# Patient Record
Sex: FEMALE | Race: WHITE | NOT HISPANIC OR LATINO | Employment: UNEMPLOYED | URBAN - METROPOLITAN AREA
[De-identification: names, ages, dates, MRNs, and addresses within clinical notes are randomized per-mention and may not be internally consistent; named-entity substitution may affect disease eponyms.]

---

## 2014-07-23 LAB
PAP RECOMMENDATION EXT: NORMAL
PAP SMEAR: NORMAL

## 2022-03-30 ENCOUNTER — HISTORICAL (OUTPATIENT)
Dept: ADMINISTRATIVE | Facility: HOSPITAL | Age: 32
End: 2022-03-30

## 2022-04-01 ENCOUNTER — PATIENT OUTREACH (OUTPATIENT)
Dept: EMERGENCY MEDICINE | Facility: HOSPITAL | Age: 32
End: 2022-04-01

## 2022-04-04 ENCOUNTER — PATIENT OUTREACH (OUTPATIENT)
Dept: EMERGENCY MEDICINE | Facility: HOSPITAL | Age: 32
End: 2022-04-04

## 2022-04-20 ENCOUNTER — PATIENT OUTREACH (OUTPATIENT)
Dept: EMERGENCY MEDICINE | Facility: HOSPITAL | Age: 32
End: 2022-04-20

## 2022-05-05 ENCOUNTER — PATIENT OUTREACH (OUTPATIENT)
Dept: EMERGENCY MEDICINE | Facility: HOSPITAL | Age: 32
End: 2022-05-05

## 2022-05-14 ENCOUNTER — HOSPITAL ENCOUNTER (EMERGENCY)
Facility: HOSPITAL | Age: 32
Discharge: HOME | End: 2022-05-14
Attending: STUDENT IN AN ORGANIZED HEALTH CARE EDUCATION/TRAINING PROGRAM
Payer: MEDICAID

## 2022-05-14 VITALS
SYSTOLIC BLOOD PRESSURE: 115 MMHG | RESPIRATION RATE: 13 BRPM | OXYGEN SATURATION: 99 % | DIASTOLIC BLOOD PRESSURE: 60 MMHG | TEMPERATURE: 97 F | HEART RATE: 83 BPM

## 2022-05-14 DIAGNOSIS — Z3A.17 17 WEEKS GESTATION OF PREGNANCY: ICD-10-CM

## 2022-05-14 DIAGNOSIS — N39.0 URINARY TRACT INFECTION WITHOUT HEMATURIA, SITE UNSPECIFIED: Primary | ICD-10-CM

## 2022-05-14 DIAGNOSIS — R55 SYNCOPE, UNSPECIFIED SYNCOPE TYPE: ICD-10-CM

## 2022-05-14 DIAGNOSIS — R55 SYNCOPE: ICD-10-CM

## 2022-05-14 DIAGNOSIS — E86.0 DEHYDRATION: ICD-10-CM

## 2022-05-14 DIAGNOSIS — R10.9 ABDOMINAL PAIN: ICD-10-CM

## 2022-05-14 LAB
ALBUMIN SERPL-MCNC: 2.8 GM/DL (ref 3.5–5)
ALBUMIN/GLOB SERPL: 0.7 RATIO (ref 1.1–2)
ALP SERPL-CCNC: 130 UNIT/L (ref 40–150)
ALT SERPL-CCNC: 12 UNIT/L (ref 0–55)
APPEARANCE UR: ABNORMAL
AST SERPL-CCNC: 13 UNIT/L (ref 5–34)
B-HCG FREE SERPL-ACNC: ABNORMAL MIU/ML
B-HCG SERPL QL: NEGATIVE
BACTERIA #/AREA URNS AUTO: ABNORMAL /HPF
BASOPHILS # BLD AUTO: 0.04 X10(3)/MCL (ref 0–0.2)
BASOPHILS NFR BLD AUTO: 0.3 %
BILIRUB UR QL STRIP.AUTO: NEGATIVE MG/DL
BILIRUBIN DIRECT+TOT PNL SERPL-MCNC: 0.3 MG/DL
BUN SERPL-MCNC: 7.5 MG/DL (ref 7–18.7)
CALCIUM SERPL-MCNC: 8.6 MG/DL (ref 8.4–10.2)
CHLORIDE SERPL-SCNC: 103 MMOL/L (ref 98–107)
CO2 SERPL-SCNC: 23 MMOL/L (ref 22–29)
COLOR UR AUTO: ABNORMAL
CREAT SERPL-MCNC: 0.65 MG/DL (ref 0.55–1.02)
EOSINOPHIL # BLD AUTO: 0.14 X10(3)/MCL (ref 0–0.9)
EOSINOPHIL NFR BLD AUTO: 1.1 %
ERYTHROCYTE [DISTWIDTH] IN BLOOD BY AUTOMATED COUNT: 14.6 % (ref 11.5–17)
GLOBULIN SER-MCNC: 3.9 GM/DL (ref 2.4–3.5)
GLUCOSE SERPL-MCNC: 86 MG/DL (ref 74–100)
GLUCOSE UR QL STRIP.AUTO: NEGATIVE MG/DL
HCT VFR BLD AUTO: 37.7 % (ref 37–47)
HGB BLD-MCNC: 12.4 GM/DL (ref 12–16)
IMM GRANULOCYTES # BLD AUTO: 0.04 X10(3)/MCL (ref 0–0.02)
IMM GRANULOCYTES NFR BLD AUTO: 0.3 % (ref 0–0.43)
KETONES UR QL STRIP.AUTO: ABNORMAL MG/DL
LEUKOCYTE ESTERASE UR QL STRIP.AUTO: ABNORMAL UNIT/L
LYMPHOCYTES # BLD AUTO: 1.77 X10(3)/MCL (ref 0.6–4.6)
LYMPHOCYTES NFR BLD AUTO: 13.9 %
MCH RBC QN AUTO: 34.1 PG (ref 27–31)
MCHC RBC AUTO-ENTMCNC: 32.9 MG/DL (ref 33–36)
MCV RBC AUTO: 103.6 FL (ref 80–94)
MONOCYTES # BLD AUTO: 0.54 X10(3)/MCL (ref 0.1–1.3)
MONOCYTES NFR BLD AUTO: 4.2 %
NEUTROPHILS # BLD AUTO: 10.2 X10(3)/MCL (ref 2.1–9.2)
NEUTROPHILS NFR BLD AUTO: 80.2 %
NITRITE UR QL STRIP.AUTO: POSITIVE
NRBC BLD AUTO-RTO: 0 %
PH UR STRIP.AUTO: 6 [PH]
PLATELET # BLD AUTO: 474 X10(3)/MCL (ref 130–400)
PMV BLD AUTO: 9.9 FL (ref 9.4–12.4)
POTASSIUM SERPL-SCNC: 4.1 MMOL/L (ref 3.5–5.1)
PROT SERPL-MCNC: 6.7 GM/DL (ref 6.4–8.3)
PROT UR QL STRIP.AUTO: ABNORMAL MG/DL
RBC # BLD AUTO: 3.64 X10(6)/MCL (ref 4.2–5.4)
RBC #/AREA URNS AUTO: <5 /HPF
RBC UR QL AUTO: ABNORMAL UNIT/L
SODIUM SERPL-SCNC: 135 MMOL/L (ref 136–145)
SP GR UR STRIP.AUTO: 1.02 (ref 1–1.03)
SQUAMOUS #/AREA URNS AUTO: 16 /LPF
UROBILINOGEN UR STRIP-ACNC: 1 MG/DL
WBC # SPEC AUTO: 12.7 X10(3)/MCL (ref 4.5–11.5)
WBC #/AREA URNS AUTO: 102 /HPF

## 2022-05-14 PROCEDURE — 87077 CULTURE AEROBIC IDENTIFY: CPT | Performed by: STUDENT IN AN ORGANIZED HEALTH CARE EDUCATION/TRAINING PROGRAM

## 2022-05-14 PROCEDURE — 80053 COMPREHEN METABOLIC PANEL: CPT | Performed by: STUDENT IN AN ORGANIZED HEALTH CARE EDUCATION/TRAINING PROGRAM

## 2022-05-14 PROCEDURE — 93005 ELECTROCARDIOGRAM TRACING: CPT

## 2022-05-14 PROCEDURE — 36415 COLL VENOUS BLD VENIPUNCTURE: CPT | Performed by: STUDENT IN AN ORGANIZED HEALTH CARE EDUCATION/TRAINING PROGRAM

## 2022-05-14 PROCEDURE — 25000003 PHARM REV CODE 250: Performed by: STUDENT IN AN ORGANIZED HEALTH CARE EDUCATION/TRAINING PROGRAM

## 2022-05-14 PROCEDURE — 85025 COMPLETE CBC W/AUTO DIFF WBC: CPT | Performed by: STUDENT IN AN ORGANIZED HEALTH CARE EDUCATION/TRAINING PROGRAM

## 2022-05-14 PROCEDURE — 93010 ELECTROCARDIOGRAM REPORT: CPT | Mod: ,,, | Performed by: INTERNAL MEDICINE

## 2022-05-14 PROCEDURE — 81001 URINALYSIS AUTO W/SCOPE: CPT | Performed by: STUDENT IN AN ORGANIZED HEALTH CARE EDUCATION/TRAINING PROGRAM

## 2022-05-14 PROCEDURE — 81025 URINE PREGNANCY TEST: CPT | Performed by: STUDENT IN AN ORGANIZED HEALTH CARE EDUCATION/TRAINING PROGRAM

## 2022-05-14 PROCEDURE — 93010 EKG 12-LEAD: ICD-10-PCS | Mod: ,,, | Performed by: INTERNAL MEDICINE

## 2022-05-14 PROCEDURE — 99285 EMERGENCY DEPT VISIT HI MDM: CPT | Mod: 25

## 2022-05-14 RX ORDER — MUPIROCIN 20 MG/G
OINTMENT TOPICAL 3 TIMES DAILY
Qty: 2 G | Refills: 0 | Status: SHIPPED | OUTPATIENT
Start: 2022-05-14 | End: 2022-05-21

## 2022-05-14 RX ORDER — CEPHALEXIN 500 MG/1
500 CAPSULE ORAL 4 TIMES DAILY
Qty: 20 CAPSULE | Refills: 0 | Status: SHIPPED | OUTPATIENT
Start: 2022-05-14 | End: 2022-05-21

## 2022-05-14 RX ORDER — CEPHALEXIN 500 MG/1
500 CAPSULE ORAL 4 TIMES DAILY
Qty: 20 CAPSULE | Refills: 0 | Status: SHIPPED | OUTPATIENT
Start: 2022-05-14 | End: 2022-05-14 | Stop reason: SDUPTHER

## 2022-05-14 RX ADMIN — SODIUM CHLORIDE 1000 ML: 9 INJECTION, SOLUTION INTRAVENOUS at 12:05

## 2022-05-14 NOTE — DISCHARGE INSTRUCTIONS
Follow up with your primary care physician in 2-3 days.      Return to the emergency department if any worsening symptoms, fever, chest pain, difficulty breathing, or any other new symptoms or concerns.

## 2022-05-14 NOTE — ED PROVIDER NOTES
Encounter Date: 2022    SCRIBE #1 NOTE: I, Brayan Caruso, am scribing for, and in the presence of,  Dr. Sutherland. I have scribed the entire note.       History     Chief Complaint   Patient presents with    Loss of Consciousness     Pt to ED via [facility] for near syncope, generalized weakness after standing outside for about an hour. 16 weeks preg, . Initial O2 sat 92% on RA Per EMS, pt tolerating RA.     A 31 year old female who is about 16-17 weeks pregnant presents to the ED via EMS after a syncopal episode pta at [facility]. Per EMS, the pt was standing outside in the heat for about 1.5 hours. Pt has nausea and lower abdominal pain. Pt denies vaginal bleeding, seizure, hitting her head, and substance abuse. Pt reports that she had her pregnancy confirmed, but pt has received minimal care.    The history is provided by the patient and the EMS personnel.   Illness   The current episode started just prior to arrival. Episode frequency: A single episode. The problem has been resolved. Nothing relieves the symptoms. Nothing aggravates the symptoms. Associated symptoms include abdominal pain and nausea. Pertinent negatives include no fever, no vomiting, no congestion, no rhinorrhea, no sore throat, no cough, no shortness of breath and no rash. She has received no recent medical care.     Review of patient's allergies indicates:   Allergen Reactions    Quetiapine Anaphylaxis    Iodine     Shellfish derived      and fish     History reviewed. No pertinent past medical history.  History reviewed. No pertinent surgical history.  History reviewed. No pertinent family history.     Review of Systems   Constitutional: Negative for chills and fever.   HENT: Negative for congestion, rhinorrhea and sore throat.    Eyes: Negative for visual disturbance.   Respiratory: Negative for cough and shortness of breath.    Cardiovascular: Negative for chest pain and leg swelling.   Gastrointestinal: Positive for  abdominal pain and nausea. Negative for vomiting.   Genitourinary: Negative for dysuria, hematuria, vaginal bleeding and vaginal discharge.   Musculoskeletal: Negative for joint swelling.   Skin: Negative for rash.   Neurological: Positive for syncope. Negative for seizures and weakness.   Psychiatric/Behavioral: Negative for confusion.       Physical Exam     Initial Vitals [05/14/22 1145]   BP Pulse Resp Temp SpO2   124/74 110 12 97.2 °F (36.2 °C) 98 %      MAP       --         Physical Exam    Nursing note and vitals reviewed.  Constitutional: She is not diaphoretic. No distress.   HENT:   Head: Normocephalic and atraumatic.   Pt has dry mucous membranes.    Eyes: EOM are normal. Pupils are equal, round, and reactive to light.   Neck: Neck supple.   Normal range of motion.  Cardiovascular: Normal rate and regular rhythm.   No murmur heard.  Pulmonary/Chest: Breath sounds normal. No respiratory distress. She has no wheezes. She has no rales.   Abdominal: Abdomen is soft. She exhibits no distension. There is no abdominal tenderness.   Pt's uterus is just below the umbilicus upon palpation.    Musculoskeletal:         General: Normal range of motion.      Cervical back: Normal range of motion and neck supple.     Neurological: She is alert and oriented to person, place, and time. She has normal strength.   Skin: Skin is warm. No rash noted.   Psychiatric: She has a normal mood and affect.         ED Course   Procedures  Labs Reviewed   COMPREHENSIVE METABOLIC PANEL - Abnormal; Notable for the following components:       Result Value    Sodium Level 135 (*)     Albumin Level 2.8 (*)     Globulin 3.9 (*)     Albumin/Globulin Ratio 0.7 (*)     All other components within normal limits   URINALYSIS, REFLEX TO URINE CULTURE - Abnormal; Notable for the following components:    Color, UA Dark Yellow (*)     Appearance, UA Cloudy (*)     Protein, UA 1+ (*)     Ketones, UA 1+ (*)     Blood, UA Trace (*)     Nitrites, UA  Positive (*)     Leukocyte Esterase, UA 2+ (*)     All other components within normal limits   HCG, QUANTITATIVE - Abnormal; Notable for the following components:    Beta Human Chorionic Gonadotropin Quantitative 17,903.12 (*)     All other components within normal limits   CBC WITH DIFFERENTIAL - Abnormal; Notable for the following components:    WBC 12.7 (*)     RBC 3.64 (*)     .6 (*)     MCH 34.1 (*)     MCHC 32.9 (*)     Platelet 474 (*)     Neut # 10.2 (*)     IG# 0.04 (*)     All other components within normal limits   URINALYSIS, MICROSCOPIC - Abnormal; Notable for the following components:    WBC,  (*)     Squamous Epithelial Cells, UA 16 (*)     Bacteria, UA 4+ (*)     All other components within normal limits   PREGNANCY TEST, URINE RAPID - Normal   CULTURE, URINE   CBC W/ AUTO DIFFERENTIAL    Narrative:     The following orders were created for panel order CBC auto differential.  Procedure                               Abnormality         Status                     ---------                               -----------         ------                     CBC with Differential[[id number]]        Abnormal            Final result                 Please view results for these tests on the individual orders.     EKG Readings: (Independently Interpreted)   NSR at rate of 85, normal axis/intervals, no ST-T segment changes 1210         ECG Results          EKG 12-lead (Final result)  Result time 05/14/22 17:46:48    Final result by Interface, Lab In [facility] (05/14/22 17:46:48)                 Narrative:    Test Reason : R55,    Vent. Rate : 085 BPM     Atrial Rate : 085 BPM     P-R Int : 152 ms          QRS Dur : 094 ms      QT Int : 396 ms       P-R-T Axes : 057 041 045 degrees     QTc Int : 471 ms    Normal sinus rhythm  Normal ECG  No previous ECGs available  Confirmed by Deni Boateng MD ([id number]) on 5/14/2022 5:46:43 PM    Referred By: HUY   SELF           Confirmed By:Deni Boateng MD                             Imaging Results          US OB 14+ Wks, TransAbd, Single Gestation (Final result)  Result time 05/14/22 13:00:54    Final result by Parrish Restrepo MD (05/14/22 13:00:54)                 Impression:      Single live intrauterine fetus with average ultrasound age of 17 weeks 4 days.      Electronically signed by: Parrish Restrepo  Date:    05/14/2022  Time:    13:00             Narrative:    EXAMINATION:  US OB 14+ WEEKS, TRANSABDOM, SINGLE GESTATION    CLINICAL HISTORY:  Unspecified abdominal pain    TECHNIQUE:  Transabdominal sonographic imaging of the pelvis.    COMPARISON:  Pelvic ultrasound 03/30/2022    FINDINGS:  Single intrauterine fetus identified in vertex position.  Posterior placenta.  Fetal movement documented on the cine clips.    The following series of measurements were obtained during the exam:    Biparietal diameter = 3.75 cm/17 weeks 3 days    Head circumference = 14.05 cm/17 weeks 3 days    Abdominal circumference = 12.10 cm/17 weeks 5 days    Femur length = 2.51 cm/17 weeks 4 days    Average gestational age by ultrasound (AUA) = 17 weeks 4 days +/-1 week 2 days    Estimated of delivery by ultrasound (NURIS) = 10/18/2022    Estimated fetal weight = 7 ounces +/-1 ounce    Fetal heart rate = 141 bpm    Single deepest pocket = 5.2 cm                                 Medications   sodium chloride 0.9% bolus 1,000 mL (1,000 mLs Intravenous New Bag 5/14/22 1245)     Medical Decision Making:   Initial Assessment:   32 yo 16-17 weeks by EGA, syncopal event standing in sun for couple of hours today. No vaginal bleedng. Dehydrated on exam. EKG wnl. Feels better after hydration, US with single IUP with normal appearance. Other workup with UTI, otherwise unremarkable. .   Differential Diagnosis:   Dehydration, orthostatic hypotension, anemia  Independently Interpreted Test(s):   I have ordered and independently interpreted EKG Reading(s) - see prior notes  Clinical Tests:   Lab Tests: Ordered and  "Reviewed  Radiological Study: Ordered and Reviewed  ED Management:  IV fluids           Scribe Attestation:   Scribe #1: I performed the above scribed service and the documentation accurately describes the services I performed. I attest to the accuracy of the note.    Attending Attestation:           Physician Attestation for Scribe:  Physician Attestation Statement for Scribe #1: I, Dr. Sutherland, reviewed documentation, as scribed by Brayan Caruso in my presence, and it is both accurate and complete.             ED Course as of 05/14/22 2023   Sat May 14, 2022   1508 NITRITE UA(!): Positive [AC]   1508 Leukocytes, UA(!): 2+ [AC]   1508 Bacteria, UA(!): 4+ [AC]   1508 WBC, UA(!): 102 [AC]   1508 Patient feels better. UTI with infection. Patient reported to me some labial pain/swelling, stated something "bit her". I examined her with Lynn as a chaperone - she has some mild R upper labial irriation, bartholins cyst or abscess. She will follow up with OBGYN in 2-3 days, I advised her to make sure they reealuate this area then.  [AC]      ED Course User Index  [AC] Eze Sutherland IV, MD             Clinical Impression:   Final diagnoses:  [R55] Syncope  [R10.9] Abdominal pain  [E86.0] Dehydration  [R55] Syncope, unspecified syncope type  [N39.0] Urinary tract infection without hematuria, site unspecified (Primary)  [Z3A.17] 17 weeks gestation of pregnancy          ED Disposition Condition    Discharge Stable        ED Prescriptions     Medication Sig Dispense Start Date End Date Auth. Provider    cephALEXin (KEFLEX) 500 MG capsule  (Status: Discontinued) Take 1 capsule (500 mg total) by mouth 4 (four) times daily. for 5 days 20 capsule 5/14/2022 5/14/2022 Eze Sutherland IV, MD    cephALEXin (KEFLEX) 500 MG capsule Take 1 capsule (500 mg total) by mouth 4 (four) times daily. for 7 days 20 capsule 5/14/2022 5/21/2022 Eze Sutherland IV, MD    mupirocin (BACTROBAN) 2 % ointment Apply topically 3 (three) times daily. Apply " to affected area up to 3x daily for 7 days 2 g 5/14/2022 5/21/2022 Eze Sutherland IV, MD        Follow-up Information     Follow up With Specialties Details Why Contact Info    OBGYN  Go in 2 days  Follow up with your OBGYN in 2 days    [facility] Emergency Dept Emergency Medicine Go to  If symptoms worsen [address] [facility] [address]  [phone]      IEze MD personally performed the history, PE, MDM, and procedures as documented above and agree with the scribe's documentation.        Eze Sutherland IV, MD  05/14/22 2024

## 2022-05-14 NOTE — ED NOTES
Orthostatic VS:  Supine:111/72 HR 96  Sittin/76 HR 90  Pt unable to stand d/t feeling weak and dizzy

## 2022-05-14 NOTE — ED NOTES
Pt reports syncopal episode today @ approx 10am, pt denied hitting head or on blood thinners. Pt reports being 16-17w pregnant

## 2022-05-17 LAB — BACTERIA UR CULT: ABNORMAL

## 2022-05-19 ENCOUNTER — PATIENT OUTREACH (OUTPATIENT)
Dept: EMERGENCY MEDICINE | Facility: HOSPITAL | Age: 32
End: 2022-05-19
Payer: MEDICAID

## 2022-07-12 ENCOUNTER — OFFICE VISIT (OUTPATIENT)
Dept: FAMILY MEDICINE | Facility: CLINIC | Age: 32
End: 2022-07-12
Payer: MEDICAID

## 2022-07-12 VITALS
BODY MASS INDEX: 26.87 KG/M2 | HEIGHT: 62 IN | SYSTOLIC BLOOD PRESSURE: 99 MMHG | HEART RATE: 108 BPM | TEMPERATURE: 98 F | DIASTOLIC BLOOD PRESSURE: 64 MMHG | RESPIRATION RATE: 20 BRPM | OXYGEN SATURATION: 98 % | WEIGHT: 146 LBS

## 2022-07-12 DIAGNOSIS — O98.112 SYPHILIS AFFECTING PREGNANCY IN SECOND TRIMESTER: ICD-10-CM

## 2022-07-12 DIAGNOSIS — Z67.91 RH NEGATIVE STATE IN ANTEPARTUM PERIOD: ICD-10-CM

## 2022-07-12 DIAGNOSIS — O26.899 RH NEGATIVE STATE IN ANTEPARTUM PERIOD: ICD-10-CM

## 2022-07-12 DIAGNOSIS — Z13.31 POSITIVE DEPRESSION SCREENING: ICD-10-CM

## 2022-07-12 DIAGNOSIS — F15.10 METHAMPHETAMINE USE: ICD-10-CM

## 2022-07-12 DIAGNOSIS — G47.00 INSOMNIA, UNSPECIFIED TYPE: ICD-10-CM

## 2022-07-12 DIAGNOSIS — K21.9 GASTROESOPHAGEAL REFLUX DISEASE, UNSPECIFIED WHETHER ESOPHAGITIS PRESENT: ICD-10-CM

## 2022-07-12 DIAGNOSIS — Z72.0 TOBACCO USER: ICD-10-CM

## 2022-07-12 DIAGNOSIS — Z3A.26 26 WEEKS GESTATION OF PREGNANCY: Primary | ICD-10-CM

## 2022-07-12 LAB
AMPHET UR QL SCN: POSITIVE
APPEARANCE UR: ABNORMAL
BACTERIA #/AREA URNS AUTO: ABNORMAL /HPF
BARBITURATE SCN PRESENT UR: NEGATIVE
BENZODIAZ UR QL SCN: POSITIVE
BILIRUB SERPL-MCNC: NORMAL MG/DL
BILIRUB UR QL STRIP.AUTO: NEGATIVE MG/DL
BLOOD URINE, POC: NORMAL
CANNABINOIDS UR QL SCN: NEGATIVE
CLARITY, POC UA: NORMAL
COCAINE UR QL SCN: NEGATIVE
COLOR UR AUTO: YELLOW
COLOR, POC UA: NORMAL
FENTANYL UR QL SCN: NEGATIVE
GLUCOSE UR QL STRIP.AUTO: NEGATIVE MG/DL
GLUCOSE UR QL STRIP: NORMAL
KETONES UR QL STRIP.AUTO: 5 MG/DL
KETONES UR QL STRIP: NORMAL
LEUKOCYTE ESTERASE UR QL STRIP.AUTO: 500 UNIT/L
LEUKOCYTE ESTERASE URINE, POC: NORMAL
MDMA UR QL SCN: NEGATIVE
MUCOUS THREADS URNS QL MICRO: ABNORMAL /LPF
NITRITE UR QL STRIP.AUTO: NEGATIVE
NITRITE, POC UA: NORMAL
OPIATES UR QL SCN: POSITIVE
PCP UR QL: NEGATIVE
PH UR STRIP.AUTO: 5.5 [PH]
PH UR: 5.5 [PH] (ref 3–11)
PH, POC UA: 5.5
PROT UR QL STRIP.AUTO: ABNORMAL MG/DL
PROTEIN, POC: NORMAL
RBC #/AREA URNS AUTO: ABNORMAL /HPF
RBC UR QL AUTO: NEGATIVE UNIT/L
SP GR UR STRIP.AUTO: 1.02
SPECIFIC GRAVITY, POC UA: 1.02
SPECIFIC GRAVITY, URINE AUTO (.000) (OHS): 1.02 (ref 1–1.03)
SQUAMOUS #/AREA URNS LPF: ABNORMAL /HPF
UROBILINOGEN UR STRIP-ACNC: 4 MG/DL
UROBILINOGEN, POC UA: 1
WBC #/AREA URNS AUTO: ABNORMAL /HPF
YEAST BUDDING URNS QL: ABNORMAL /HPF

## 2022-07-12 PROCEDURE — 81001 URINALYSIS AUTO W/SCOPE: CPT | Mod: 59

## 2022-07-12 PROCEDURE — 99214 OFFICE O/P EST MOD 30 MIN: CPT | Mod: PBBFAC

## 2022-07-12 PROCEDURE — 80307 DRUG TEST PRSMV CHEM ANLYZR: CPT

## 2022-07-12 PROCEDURE — 87088 URINE BACTERIA CULTURE: CPT

## 2022-07-12 PROCEDURE — 81002 URINALYSIS NONAUTO W/O SCOPE: CPT | Mod: PBBFAC

## 2022-07-12 RX ORDER — HYDROXYZINE PAMOATE 25 MG/1
25 CAPSULE ORAL NIGHTLY PRN
Qty: 30 CAPSULE | Refills: 1 | Status: SHIPPED | OUTPATIENT
Start: 2022-07-12 | End: 2022-08-11

## 2022-07-12 NOTE — PROGRESS NOTES
"[facility] OB OFFICE VISIT NOTE  Nancy Ramos  [id number]  2022      Chief Complaint: Initial Prenatal Visit (IOB 26w3d. C/O: intermittent bladder leakage.)      Nancy Ramos is a 31 y.o.  female 26w3d by US completed on 3/30/22. (NURIS: 10/18/22) presenting to [facility] for initial prenatal visit.    Current Issues: trouble sleeping at night. Takes care of her mother at night. Also complaining of heartburn, daily. With associated nausea and vomiting 1-2x/week. Previous OB was Dr. Matias. Reports only taking prenatal vitamins with occasional meth and tobacco use. Would like resources on how to get "clean". Denies any SI or HI    Chronic Issues:   -HLD (dx at age 16)-Was taking a medication but quit taking it 1 year ago, does not recall the name of the drug.    -Rh negative-had vaginal bleeding 1st 2 months of pregnancy, states that she did receive RhoGAM shots with each episode.   -Hx of Syphillis- treated at health unit    Gestational History:  - G1: current    Gyn History:   - LMP: 22  - Age at menarche: 9 years  - Menstrual hx: regular, 28 day cycles, 4-5pads/day, 3-4 days per period  - History of birth control: used to take Depo-provera. Would like OCPs as postpartum contraception  - History of STDs and/or Abnormal PAPs: Syphilis, treated at the health unit. Received total of 2 penicillin G injections.     Past Medical History: denies   Surgical History: denies  Family History: denies  Social History: Tobacco use (8 years, 3 cigarettes/day), Meth use (3 days ago). Denies ETOH or other illicit drug use.   Medications: prenatal vitamins    Review of Systems  Constitutional: no fever, no chills  CV: no chest pain  RESP: no SOB  : no dysuria, no hematuria  GI: no constipation, no diarrhea, no nausea, no vomiting  Psych: no depression, no anxiety    Antepartum specific   - Fetal movements: yes  - Vaginal bleeding: no  - Vaginal discharge: no  - Loss of fluid: no  - Contractions: no  - Headaches: 2-3 " "HA/week, resolve with rest  - Vision changes: no  - Edema: no    Physical Exam   Blood pressure 99/64, pulse 108, temperature 97.9 °F (36.6 °C), temperature source Oral, resp. rate 20, height 5' 2" (1.575 m), weight 66.2 kg (146 lb), SpO2 98 %.   General: in no acute distress  RESP: clear to auscultation bilaterally, non labored  CV: regular rate and rhythm, no murmurs, no edema  ABD: gravid, nontender, BS+  FHTs: 150 bpm  Fundal height: 27 cm    Cervical: closed, firm, posterior  External genitalia: Normal female genitalia without lesion, discharge or tenderness.   Speculum Exam: Vaginal vault without discharge, nonodorous, no lesions/masses seen.  Cervical os visualized as closed, no lesions/masses.   Note: RN chaperone present for entirety of genital exam.    Current Medications:   No current outpatient medications on file.     No current facility-administered medications for this visit.       Labs:  Urine dipstick: reviewed     Initial OB Labs: awaiting records for review  - Blood Type and Rh:   - Antibody Screen:   - CBC H/H:   - HIV:   - Syphilis Ab:   - GC:   - CT:   - HBsAg:   - HCVAb:   - Rubella:   - Varicella:   - UA & Culture:   - Sickle Cell Screen:   - PAP:   - Influenza vaccine date:   - BTL desired:     15-20 Weeks Lab  - Quad Screen: out of range       Imaging:   Initial US 3/30/21:   1.  Single viable uterine pregnancy with heart rate of 170 bpm and AUA  of 11 weeks and 4 days.  2.  Low-lying placenta which encroaches on the internal os.    OB US 5/14/22:   Single live intrauterine pregnancy at 17.4WGA. No comment on placenta.    Anatomy Scan : To be scheduled      Assessment:   1. 26 weeks gestation of pregnancy    2. Positive depression screening    3. Tobacco user    4. Methamphetamine use    5. Syphilis affecting pregnancy in second trimester    6. Rh negative state in antepartum period    7. Insomnia, unspecified type    8. Gastroesophageal reflux disease, unspecified whether esophagitis " present        Plan:  - OB Protocol, PNVs   - Urine dip reviewed as above. UA and culture pending   - Routine (initial) labs: PENDING. UDS ordered and pending  - Request for records from Dr. Matias's office sent   - Referral to [facility]   - Mother plans to breast and/or bottlefeed  - Postpartum contraception discussion: OCPs, to be started at 6 week postpartum visit   - Pt counseled extensively on effects of drug use during pregnancy and DCFS involvement if present in newborns urine/meconium. Follow up on drug use at each visit.  - Will need to repeat depression screening. Currently denies any SI/HI. Just wants help with drug abuse cessation.  - [facility]  - [facility]   - Will request records for proof of treatment from health unit   - Pt counseled on safe sex practices. Always use barrier contraception when sexually active.   -Will need RhoGAM at 28 week after initial labs are reviewed and Rh status is confirmed   -Trial of Vistaril for insomnia and Prevacid for acid reflux. Pt educated on sleep hygiene.     Return to [facility] in 3 weeks for routine follow up. If patient's prenatal records not in chart, please obtain all initial labs (especially HepC antibody)  and 28 week labs.     Kristen Ortega MD  [employer]  Resident, HO-3

## 2022-07-13 DIAGNOSIS — Z3A.26 26 WEEKS GESTATION OF PREGNANCY: Primary | ICD-10-CM

## 2022-07-13 PROBLEM — O26.899 RH NEGATIVE STATE IN ANTEPARTUM PERIOD: Status: ACTIVE | Noted: 2022-07-13

## 2022-07-13 PROBLEM — K29.70 GASTRITIS: Status: ACTIVE | Noted: 2022-07-13

## 2022-07-13 PROBLEM — Z67.91 RH NEGATIVE STATE IN ANTEPARTUM PERIOD: Status: ACTIVE | Noted: 2022-07-13

## 2022-07-13 PROBLEM — F41.1 GENERALIZED ANXIETY DISORDER: Status: ACTIVE | Noted: 2022-07-13

## 2022-07-13 PROBLEM — Z72.0 TOBACCO USER: Status: ACTIVE | Noted: 2022-07-13

## 2022-07-13 PROBLEM — O98.112 SYPHILIS AFFECTING PREGNANCY IN SECOND TRIMESTER: Status: ACTIVE | Noted: 2022-07-13

## 2022-07-13 RX ORDER — CALC/MAG/B COMPLEX/D3/HERB 61
15 TABLET ORAL DAILY
Qty: 30 CAPSULE | Refills: 1 | Status: ON HOLD | OUTPATIENT
Start: 2022-07-13 | End: 2022-08-30

## 2022-07-14 LAB — BACTERIA UR CULT: NO GROWTH

## 2022-07-14 NOTE — PROGRESS NOTES
"[facility] OB OFFICE VISIT NOTE  Nancy Ramos  [id number]  2022      Chief Complaint: Initial Prenatal Visit (IOB 26w3d. C/O: intermittent bladder leakage.)      Nancy Ramos is a 31 y.o.  female 26w3d by US completed on 3/30/22. (NURIS: 10/18/22) presenting to [facility] for initial prenatal visit.    Current Issues: trouble sleeping at night. Takes care of her mother at night. Also complaining of heartburn, daily. With associated nausea and vomiting 1-2x/week. Previous OB was Dr. Matias. Reports only taking prenatal vitamins with occasional meth and tobacco use. Would like resources on how to get "clean".     Chronic Issues:   -HLD (dx at age 16)-Was taking a medication but quit taking it 1 year ago, does not recall the name of the drug.    -Rh negative-had vaginal bleeding 1st 2 months of pregnancy, states that she did receive RhoGAM shots with each episode.   -Hx of Syphillis- treated at health unit    Gestational History:  - G1: current    Gyn History:   - LMP: 22  - Age at menarche: 9 years  - Menstrual hx: regular, 28 day cycles, 4-5pads/day, 3-4 days per period  - History of birth control: used to take Depo-provera. Would like OCPs as postpartum contraception  - History of STDs and/or Abnormal PAPs: Syphilis, treated at the health unit. Received total of 2 penicillin G injections.     Past Medical History: denies   Surgical History: denies  Family History: denies  Social History: Tobacco use (8 years, 3 cigarettes/day), Meth use (3 days ago). Denies ETOH or other illicit drug use.   Medications: prenatal vitamins    Review of Systems  Constitutional: no fever, no chills  CV: no chest pain  RESP: no SOB  : no dysuria, no hematuria  GI: no constipation, no diarrhea, no nausea, no vomiting  Psych: no depression, no anxiety    Antepartum specific   - Fetal movements: yes  - Vaginal bleeding: no  - Vaginal discharge: no  - Loss of fluid: no  - Contractions: no  - Headaches: 2-3 HA/week, resolve " "with rest  - Vision changes: no  - Edema: no    Physical Exam   Blood pressure 99/64, pulse 108, temperature 97.9 °F (36.6 °C), temperature source Oral, resp. rate 20, height 5' 2" (1.575 m), weight 66.2 kg (146 lb), SpO2 98 %.   General: in no acute distress  RESP: clear to auscultation bilaterally, non labored  CV: regular rate and rhythm, no murmurs, no edema  ABD: gravid, nontender, BS+  FHTs: 150 bpm  Fundal height: 27 cm    Cervical: closed, firm, posterior  External genitalia: Normal female genitalia without lesion, discharge or tenderness.   Speculum Exam: Vaginal vault without discharge, nonodorous, no lesions/masses seen.  Cervical os visualized as closed, no lesions/masses.   Note: RN chaperone present for entirety of genital exam.      Current Medications:   No current outpatient medications on file.     No current facility-administered medications for this visit.       Labs:  Urine dipstick: reviewed     Initial OB Labs: awaiting records for review  - Blood Type and Rh:   - Antibody Screen:   - CBC H/H:   - HIV:   - Syphilis Ab:   - GC:   - CT:   - HBsAg:   - HCVAb:   - Rubella:   - Varicella:   - UA & Culture:   - Sickle Cell Screen:   - PAP:   - Influenza vaccine date:   - BTL desired:     15-20 Weeks Lab  - Quad Screen: out of range       Imaging:   Initial US 3/30/21:   1.  Single viable uterine pregnancy with heart rate of 170 bpm and AUA  of 11 weeks and 4 days.  2.  Low-lying placenta which encroaches on the internal os.    OB US 5/14/22:   Single live intrauterine pregnancy at 17.4WGA. No comment on placenta.    Anatomy Scan : To be scheduled      Assessment:   1. 26 weeks gestation of pregnancy    2. Positive depression screening    3. Tobacco user    4. Methamphetamine use    5. Syphilis affecting pregnancy in second trimester    6. Rh negative state in antepartum period    7. Insomnia, unspecified type    8. Gastroesophageal reflux disease, unspecified whether esophagitis present  "       Plan:  - OB Protocol, PNVs   - Urine dip reviewed as above. UA and culture pending   - Routine (initial) labs: PENDING. UDS ordered and pending  - Request for records from Dr. Matias's office sent   - Referral to [facility]   - Mother plans to breast and/or bottlefeed  - Postpartum contraception discussion: OCPs, to be started at 6 week postpartum visit   - Pt counseled extensively on effects of drug use during pregnancy and DCFS involvement if present in newborns urine/meconium. Follow up on drug use at each visit.  - [facility]  - Referral to [facility]   - Will request records for proof of treatment from health unit   - Pt counseled on safe sex practices. Always use barrier contraception when sexually active.   -Will need RhoGAM at 28 week after initial labs are reviewed and Rh status is confirmed   -Trial of Vistaril for insomnia and Prevacid for acid reflux. Pt educated on sleep hygiene.     Return to [facility] in 3 weeks for routine follow up. If patient's prenatal records not in chart, please obtain all initial labs (especially HepC antibody)  and 28 week labs.     Kristen Ortega MD  [employer]  Resident, HO-3

## 2022-07-16 ENCOUNTER — HOSPITAL ENCOUNTER (EMERGENCY)
Facility: HOSPITAL | Age: 32
Discharge: LEFT BEFORE DISCHARGE | End: 2022-07-16
Payer: MEDICAID

## 2022-07-16 PROCEDURE — 99281 EMR DPT VST MAYX REQ PHY/QHP: CPT | Mod: 25

## 2022-07-17 ENCOUNTER — HOSPITAL ENCOUNTER (EMERGENCY)
Facility: HOSPITAL | Age: 32
Discharge: HOME | End: 2022-07-17
Attending: EMERGENCY MEDICINE
Payer: COMMERCIAL

## 2022-07-17 ENCOUNTER — HOSPITAL ENCOUNTER (EMERGENCY)
Facility: HOSPITAL | Age: 32
Discharge: HOME | End: 2022-07-17
Payer: COMMERCIAL

## 2022-07-17 VITALS
TEMPERATURE: 97 F | DIASTOLIC BLOOD PRESSURE: 62 MMHG | OXYGEN SATURATION: 100 % | HEART RATE: 83 BPM | SYSTOLIC BLOOD PRESSURE: 108 MMHG

## 2022-07-17 VITALS
HEART RATE: 101 BPM | DIASTOLIC BLOOD PRESSURE: 60 MMHG | OXYGEN SATURATION: 100 % | WEIGHT: 141.13 LBS | TEMPERATURE: 98 F | SYSTOLIC BLOOD PRESSURE: 123 MMHG | HEIGHT: 62 IN | RESPIRATION RATE: 20 BRPM | BODY MASS INDEX: 25.97 KG/M2
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
## 2022-07-17 DIAGNOSIS — R10.9 ABDOMINAL PAIN DURING PREGNANCY IN SECOND TRIMESTER: Primary | ICD-10-CM

## 2022-07-17 DIAGNOSIS — O26.892 ABDOMINAL PAIN DURING PREGNANCY IN SECOND TRIMESTER: Primary | ICD-10-CM
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
## 2022-07-17 DIAGNOSIS — O26.899 ABDOMINAL PAIN AFFECTING PREGNANCY: ICD-10-CM

## 2022-07-17 DIAGNOSIS — R10.9 ABDOMINAL PAIN AFFECTING PREGNANCY: ICD-10-CM

## 2022-07-17 DIAGNOSIS — N30.00 ACUTE CYSTITIS WITHOUT HEMATURIA: Primary | ICD-10-CM

## 2022-07-17 DIAGNOSIS — F11.10 NARCOTIC ABUSE: ICD-10-CM

## 2022-07-17 DIAGNOSIS — F15.10 METHAMPHETAMINE ABUSE: ICD-10-CM

## 2022-07-17 LAB
ALBUMIN SERPL-MCNC: 2.6 GM/DL (ref 3.5–5)
ALBUMIN/GLOB SERPL: 0.7 RATIO (ref 1.1–2)
ALP SERPL-CCNC: 130 UNIT/L (ref 40–150)
ALT SERPL-CCNC: 13 UNIT/L (ref 0–55)
AMPHET UR QL SCN: POSITIVE
APPEARANCE UR: CLEAR
AST SERPL-CCNC: 14 UNIT/L (ref 5–34)
BACTERIA #/AREA URNS AUTO: ABNORMAL /HPF
BARBITURATE SCN PRESENT UR: NEGATIVE
BASOPHILS # BLD AUTO: 0.02 X10(3)/MCL (ref 0–0.2)
BASOPHILS NFR BLD AUTO: 0.3 %
BENZODIAZ UR QL SCN: NEGATIVE
BILIRUB UR QL STRIP.AUTO: NEGATIVE MG/DL
BILIRUBIN DIRECT+TOT PNL SERPL-MCNC: 0.4 MG/DL
BUN SERPL-MCNC: 6.3 MG/DL (ref 7–18.7)
CALCIUM SERPL-MCNC: 8.9 MG/DL (ref 8.4–10.2)
CANNABINOIDS UR QL SCN: NEGATIVE
CHLORIDE SERPL-SCNC: 108 MMOL/L (ref 98–107)
CO2 SERPL-SCNC: 18 MMOL/L (ref 22–29)
COCAINE UR QL SCN: NEGATIVE
COLOR UR AUTO: YELLOW
CREAT SERPL-MCNC: 0.55 MG/DL (ref 0.55–1.02)
EOSINOPHIL # BLD AUTO: 0.07 X10(3)/MCL (ref 0–0.9)
EOSINOPHIL NFR BLD AUTO: 0.9 %
ERYTHROCYTE [DISTWIDTH] IN BLOOD BY AUTOMATED COUNT: 14.1 % (ref 11.5–17)
FENTANYL UR QL SCN: POSITIVE
GLOBULIN SER-MCNC: 3.9 GM/DL (ref 2.4–3.5)
GLUCOSE SERPL-MCNC: 70 MG/DL (ref 74–100)
GLUCOSE UR QL STRIP.AUTO: NEGATIVE MG/DL
HCT VFR BLD AUTO: 36.8 % (ref 37–47)
HGB BLD-MCNC: 12.1 GM/DL (ref 12–16)
IMM GRANULOCYTES # BLD AUTO: 0.03 X10(3)/MCL (ref 0–0.04)
IMM GRANULOCYTES NFR BLD AUTO: 0.4 %
KETONES UR QL STRIP.AUTO: ABNORMAL MG/DL
LEUKOCYTE ESTERASE UR QL STRIP.AUTO: ABNORMAL UNIT/L
LYMPHOCYTES # BLD AUTO: 1.96 X10(3)/MCL (ref 0.6–4.6)
LYMPHOCYTES NFR BLD AUTO: 25.3 %
MCH RBC QN AUTO: 33.3 PG (ref 27–31)
MCHC RBC AUTO-ENTMCNC: 32.9 MG/DL (ref 33–36)
MCV RBC AUTO: 101.4 FL (ref 80–94)
MDMA UR QL SCN: NEGATIVE
MONOCYTES # BLD AUTO: 0.36 X10(3)/MCL (ref 0.1–1.3)
MONOCYTES NFR BLD AUTO: 4.7 %
NEUTROPHILS # BLD AUTO: 5.3 X10(3)/MCL (ref 2.1–9.2)
NEUTROPHILS NFR BLD AUTO: 68.4 %
NITRITE UR QL STRIP.AUTO: NEGATIVE
NRBC BLD AUTO-RTO: 0 %
OPIATES UR QL SCN: NEGATIVE
PCP UR QL: NEGATIVE
PH UR STRIP.AUTO: 6 [PH]
PH UR: 5.5 [PH] (ref 3–11)
PLATELET # BLD AUTO: 213 X10(3)/MCL (ref 130–400)
PLATELETS.RETICULATED NFR BLD AUTO: 9.5 % (ref 0.9–11.2)
PMV BLD AUTO: 11.2 FL (ref 7.4–10.4)
POTASSIUM SERPL-SCNC: 3.7 MMOL/L (ref 3.5–5.1)
PROT SERPL-MCNC: 6.5 GM/DL (ref 6.4–8.3)
PROT UR QL STRIP.AUTO: ABNORMAL MG/DL
RBC # BLD AUTO: 3.63 X10(6)/MCL (ref 4.2–5.4)
RBC #/AREA URNS AUTO: <5 /HPF
RBC UR QL AUTO: NEGATIVE UNIT/L
SARS-COV-2 RDRP RESP QL NAA+PROBE: NEGATIVE
SODIUM SERPL-SCNC: 138 MMOL/L (ref 136–145)
SP GR UR STRIP.AUTO: 1.02 (ref 1–1.03)
SPECIFIC GRAVITY, URINE AUTO (.000) (OHS): 1.02 (ref 1–1.03)
SQUAMOUS #/AREA URNS AUTO: 9 /HPF
UROBILINOGEN UR STRIP-ACNC: 1 MG/DL
WBC # SPEC AUTO: 7.7 X10(3)/MCL (ref 4.5–11.5)
WBC #/AREA URNS AUTO: 9 /HPF

## 2022-07-17 PROCEDURE — 85025 COMPLETE CBC W/AUTO DIFF WBC: CPT | Performed by: EMERGENCY MEDICINE

## 2022-07-17 PROCEDURE — 81001 URINALYSIS AUTO W/SCOPE: CPT | Performed by: OBSTETRICS & GYNECOLOGY

## 2022-07-17 PROCEDURE — 36415 COLL VENOUS BLD VENIPUNCTURE: CPT | Performed by: EMERGENCY MEDICINE

## 2022-07-17 PROCEDURE — 99284 EMERGENCY DEPT VISIT MOD MDM: CPT | Mod: 25

## 2022-07-17 PROCEDURE — 80053 COMPREHEN METABOLIC PANEL: CPT | Performed by: EMERGENCY MEDICINE

## 2022-07-17 PROCEDURE — 80307 DRUG TEST PRSMV CHEM ANLYZR: CPT | Performed by: EMERGENCY MEDICINE

## 2022-07-17 PROCEDURE — 63600175 PHARM REV CODE 636 W HCPCS: Performed by: EMERGENCY MEDICINE

## 2022-07-17 PROCEDURE — 63600175 PHARM REV CODE 636 W HCPCS: Performed by: OBSTETRICS & GYNECOLOGY

## 2022-07-17 PROCEDURE — 96374 THER/PROPH/DIAG INJ IV PUSH: CPT

## 2022-07-17 PROCEDURE — 87635 SARS-COV-2 COVID-19 AMP PRB: CPT | Performed by: EMERGENCY MEDICINE

## 2022-07-17 PROCEDURE — 99285 EMERGENCY DEPT VISIT HI MDM: CPT | Mod: 25,27

## 2022-07-17 RX ORDER — ONDANSETRON 2 MG/ML
4 INJECTION INTRAMUSCULAR; INTRAVENOUS
Status: COMPLETED | OUTPATIENT
Start: 2022-07-17 | End: 2022-07-17

## 2022-07-17 RX ORDER — DEXTROSE, SODIUM CHLORIDE, SODIUM LACTATE, POTASSIUM CHLORIDE, AND CALCIUM CHLORIDE 5; .6; .31; .03; .02 G/100ML; G/100ML; G/100ML; G/100ML; G/100ML
INJECTION, SOLUTION INTRAVENOUS CONTINUOUS
Status: DISCONTINUED | OUTPATIENT
Start: 2022-07-17 | End: 2022-07-17 | Stop reason: HOSPADM

## 2022-07-17 RX ADMIN — SODIUM CHLORIDE, SODIUM LACTATE, POTASSIUM CHLORIDE, CALCIUM CHLORIDE AND DEXTROSE MONOHYDRATE: 5; 600; 310; 30; 20 INJECTION, SOLUTION INTRAVENOUS at 07:07

## 2022-07-17 RX ADMIN — ONDANSETRON 4 MG: 2 INJECTION INTRAMUSCULAR; INTRAVENOUS at 05:07
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
## 2022-07-18 DIAGNOSIS — Z36.89 ENCOUNTER FOR FETAL ANATOMIC SURVEY: Primary | ICD-10-CM
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
## 2022-08-02 DIAGNOSIS — Z36.89 ENCOUNTER FOR FETAL ANATOMIC SURVEY: Primary | ICD-10-CM

## 2022-08-03 ENCOUNTER — PROCEDURE VISIT (OUTPATIENT)
Dept: MATERNAL FETAL MEDICINE | Facility: CLINIC | Age: 32
End: 2022-08-03
Payer: MEDICAID

## 2022-08-03 DIAGNOSIS — Z36.89 ENCOUNTER FOR FETAL ANATOMIC SURVEY: ICD-10-CM

## 2022-08-03 PROCEDURE — 76811 US MFM PROCEDURE (VIEWPOINT): ICD-10-PCS | Mod: S$GLB,,, | Performed by: OBSTETRICS & GYNECOLOGY

## 2022-08-03 PROCEDURE — 76811 OB US DETAILED SNGL FETUS: CPT | Mod: S$GLB,,, | Performed by: OBSTETRICS & GYNECOLOGY

## 2022-08-09 DIAGNOSIS — Z36.89 ENCOUNTER FOR FETAL ANATOMIC SURVEY: Primary | ICD-10-CM

## 2022-08-11 ENCOUNTER — OFFICE VISIT (OUTPATIENT)
Dept: FAMILY MEDICINE | Facility: CLINIC | Age: 32
End: 2022-08-11
Payer: MEDICAID

## 2022-08-11 VITALS
HEART RATE: 110 BPM | SYSTOLIC BLOOD PRESSURE: 110 MMHG | OXYGEN SATURATION: 98 % | BODY MASS INDEX: 28.31 KG/M2 | HEIGHT: 62 IN | DIASTOLIC BLOOD PRESSURE: 69 MMHG | RESPIRATION RATE: 18 BRPM | TEMPERATURE: 98 F | WEIGHT: 153.81 LBS

## 2022-08-11 DIAGNOSIS — Z3A.30 30 WEEKS GESTATION OF PREGNANCY: Primary | ICD-10-CM

## 2022-08-11 LAB
BILIRUB SERPL-MCNC: NORMAL MG/DL
BLOOD URINE, POC: NORMAL
CLARITY, POC UA: CLEAR
COLOR, POC UA: NORMAL
GLUCOSE UR QL STRIP: NEGATIVE
KETONES UR QL STRIP: NORMAL
LEUKOCYTE ESTERASE URINE, POC: NORMAL
NITRITE, POC UA: NEGATIVE
PH, POC UA: 6
PROTEIN, POC: NORMAL
SPECIFIC GRAVITY, POC UA: 1.02
UROBILINOGEN, POC UA: NORMAL

## 2022-08-11 PROCEDURE — 99214 OFFICE O/P EST MOD 30 MIN: CPT | Mod: PBBFAC

## 2022-08-11 PROCEDURE — 81002 URINALYSIS NONAUTO W/O SCOPE: CPT | Mod: PBBFAC

## 2022-08-11 NOTE — PROGRESS NOTES
[facility] OB OFFICE VISIT NOTE  Nancy Ramos  [id number]  2022      Chief Complaint: No chief complaint on file.      Nancy Ramos is a 31 y.o.  female 30w5d by US completed on 3/30/22. (NURIS: 10/18/22) presenting to [facility] visit for initial and 28 wk lab draw and f/u    Current Issues: none at this time  Chronic Issues:     -HLD (dx at age 16)-Was taking a medication but quit taking it 1 year ago, does not recall the name of the drug.    -Rh negative-had vaginal bleeding 1st 2 months of pregnancy, states that she did receive RhoGAM shots with each episode.   -Hx of Syphillis- treated at health unit    Gestational History:  - G1: current    Gyn History:   - LMP: 22  - Age at menarche: 9 years  - Menstrual hx: regular, 28 day cycles, 4-5pads/day, 3-4 days per period  - History of birth control: used to take Depo-provera. Would like OCPs as postpartum contraception  - History of STDs and/or Abnormal PAPs: Syphilis, treated at the health unit. Received total of 2 penicillin G injections.     Past Medical History: denies   Surgical History: denies  Family History: denies  Social History: Tobacco use (8 years, 3 cigarettes/day), fentanyl and amphetamine use; Denies ETOH or other illicit drug use.   Medications: prenatal vitamins    Review of Systems  Constitutional: no fever, no chills  CV: no chest pain  RESP: no SOB  : no dysuria, no hematuria  GI: no constipation, no diarrhea, no nausea, no vomiting  Psych: no depression, no anxiety    Antepartum specific   - Fetal movements: yes  - Vaginal bleeding: no  - Vaginal discharge: no  - Loss of fluid: no  - Contractions: no  - Headaches: no  - Vision changes: no  - Edema: no    Physical Exam   Vitals:    22 1431   BP: 110/69   Pulse: 110   Resp: 18   Temp: 98.1 °F (36.7 °C)       General: in no acute distress  RESP: clear to auscultation bilaterally, non labored  CV: regular rate and rhythm, no murmurs, no edema  ABD: gravid, nontender, BS+  FHTs:  150 bpm  Fundal height: 30 cm    Current Medications:   Current Outpatient Medications   Medication Sig Dispense Refill    hydrOXYzine pamoate (VISTARIL) 25 MG Cap Take 1 capsule (25 mg total) by mouth nightly as needed (insomnia). 30 capsule 1    lansoprazole (PREVACID 24HR) 15 MG capsule Take 1 capsule (15 mg total) by mouth once daily. 30 capsule 1    prenatal 25/iron fum/folic/dha (PRENATAL-1 ORAL) Take 1 tablet by mouth once daily.       No current facility-administered medications for this visit.       Labs:  Urine dipstick:   POCT urine dipstick without microscope  Order: [id number]   Status: Final result     Visible to patient: Yes (seen)     Next appt: 08/23/2022 at 10:15 AM in Family Medicine (HIGH RISK OB, [facility] RESIDENTS)     Dx: 30 weeks gestation of pregnancy     0 Result Notes    Component 14:32    Color, UA Dark Yellow    pH, UA 6.0    WBC, UA Trace    Nitrite, UA Negative    Protein, POC 30mg/dL    Glucose, UA Negative    Ketones, UA Trace    Urobilinogen, UA 1.0 E.U./dL    Bilirubin, POC Small    Blood, UA Trace-Intact    Clarity, UA Clear    Spec Grav UA 1.025               Specimen Collected: 08/11/22 14:32 Last Resulted: 08/11/22 14:32             Initial OB Labs: Collected pending  - Blood Type and Rh: pending  - Antibody Screen: pending  - CBC H/H: 12.1/36.8  - HIV: pending  - Syphilis Ab: pending  - GC: negative  - CT: negative  - HBsAg: pending  - HCVAb: pending  - Rubella: pending  - Varicella: pending  - UA & Culture: no growth  - PAP:   - BTL desired: not at this time    15-20 Weeks Lab  - Quad Screen: out of range     28 Week Lab: Collected pending  -1H GTT:   -Rhogam:   -Date of Tdap:   -CBC H/H:   -RPR:   -BTL Discussion:     Imaging:   Initial US 3/30/21:   1.  Single viable uterine pregnancy with heart rate of 170 bpm and AUA  of 11 weeks and 4 days.  2.  Low-lying placenta which encroaches on the internal os.    OB US 5/14/22:   Single live intrauterine pregnancy at 17.4WGA. No  comment on placenta.    Anatomy Scan : To be scheduled      Assessment:   Nancy was seen today for routine prenatal visit and 28wk labs.    Diagnoses and all orders for this visit:    -     SYPHILIS ANTIBODY (WITH REFLEX RPR); Future  -     Hepatitis C Antibody; Future  -     HIV 1/2 Ag/Ab (4th Gen); Future  -     CBC Auto Differential; Future  -     Glucose Tolerance 1 Hour; Future  -     [place] - [facility] (Fetal Bleed Screen); Future  -     Prepare Rh Immune Globulin; Future  -     Type & Screen; Future  -     Hepatitis B Surface Antigen; Future  -     Antibody Screen; Future        30 weeks gestation of pregnancy    Plan:  - OB Protocol, PNVs   - Urine dip reviewed as above. UA and culture no growth  - Routine (initial) labs: PENDING. 28 wks labs pending to be completed 08/12/22  - follows MFM   - Mother plans to breast and/or bottlefeed  - Postpartum contraception discussion: OCPs, to be started at 6 week postpartum visit   - Pt counseled extensively on effects of drug use during pregnancy and DCFS involvement if present in newborns urine/meconium. Follow up on drug use at each visit.  - Will need to repeat depression screening. Currently denies any SI/HI. Just wants help with drug abuse cessation.  - Referral to [facility]  - Referral to [facility]   - Pt counseled on safe sex practices. Always use barrier contraception when sexually active.     Return to [facility] in 1 day for lab draw.    Patient and plan discussed w/Dr. Noni Pickett MD  [facility] Resident, HO-1

## 2022-08-16 ENCOUNTER — TELEPHONE (OUTPATIENT)
Dept: OBGYN | Facility: CLINIC | Age: 32
End: 2022-08-16
Payer: MEDICAID

## 2022-08-24 ENCOUNTER — PROCEDURE VISIT (OUTPATIENT)
Dept: MATERNAL FETAL MEDICINE | Facility: CLINIC | Age: 32
End: 2022-08-24
Payer: MEDICAID

## 2022-08-24 ENCOUNTER — OFFICE VISIT (OUTPATIENT)
Dept: MATERNAL FETAL MEDICINE | Facility: CLINIC | Age: 32
End: 2022-08-24
Payer: MEDICAID

## 2022-08-24 ENCOUNTER — HOSPITAL ENCOUNTER (INPATIENT)
Facility: HOSPITAL | Age: 32
LOS: 6 days | Discharge: HOME | End: 2022-08-30
Attending: OBSTETRICS & GYNECOLOGY | Admitting: OBSTETRICS & GYNECOLOGY
Payer: MEDICAID

## 2022-08-24 VITALS
BODY MASS INDEX: 27.6 KG/M2 | WEIGHT: 150 LBS | HEIGHT: 62 IN | DIASTOLIC BLOOD PRESSURE: 66 MMHG | SYSTOLIC BLOOD PRESSURE: 129 MMHG | HEART RATE: 80 BPM

## 2022-08-24 DIAGNOSIS — O42.919 PRETERM PREMATURE RUPTURE OF MEMBRANES (PPROM) WITH UNKNOWN ONSET OF LABOR: ICD-10-CM

## 2022-08-24 DIAGNOSIS — O36.5930 INTRAUTERINE GROWTH RESTRICTION AFFECTING ANTEPARTUM CARE OF MOTHER IN THIRD TRIMESTER, SINGLE OR UNSPECIFIED FETUS: ICD-10-CM

## 2022-08-24 DIAGNOSIS — O99.320 DRUG ABUSE DURING PREGNANCY: ICD-10-CM

## 2022-08-24 DIAGNOSIS — Z3A.30 30 WEEKS GESTATION OF PREGNANCY: ICD-10-CM

## 2022-08-24 DIAGNOSIS — Z36.89 ENCOUNTER FOR FETAL ANATOMIC SURVEY: ICD-10-CM

## 2022-08-24 DIAGNOSIS — O41.03X0 OLIGOHYDRAMNIOS ANTEPARTUM, THIRD TRIMESTER, NOT APPLICABLE OR UNSPECIFIED FETUS: ICD-10-CM

## 2022-08-24 DIAGNOSIS — F19.10 DRUG ABUSE DURING PREGNANCY: ICD-10-CM

## 2022-08-24 DIAGNOSIS — O36.5990 IUGR (INTRAUTERINE GROWTH RESTRICTION) AFFECTING CARE OF MOTHER: ICD-10-CM

## 2022-08-24 PROBLEM — Z3A.32 PREGNANCY WITH 32 COMPLETED WEEKS GESTATION: Status: ACTIVE | Noted: 2022-08-24

## 2022-08-24 PROBLEM — O99.323 SUBSTANCE ABUSE AFFECTING PREGNANCY IN THIRD TRIMESTER, ANTEPARTUM: Status: ACTIVE | Noted: 2022-08-24

## 2022-08-24 LAB
ALBUMIN SERPL-MCNC: 2.5 GM/DL (ref 3.5–5)
ALBUMIN/GLOB SERPL: 0.6 RATIO (ref 1.1–2)
ALP SERPL-CCNC: 210 UNIT/L (ref 40–150)
ALT SERPL-CCNC: 13 UNIT/L (ref 0–55)
AMPHET UR QL SCN: POSITIVE
AST SERPL-CCNC: 19 UNIT/L (ref 5–34)
BARBITURATE SCN PRESENT UR: NEGATIVE
BASOPHILS # BLD AUTO: 0.03 X10(3)/MCL (ref 0–0.2)
BASOPHILS NFR BLD AUTO: 0.3 %
BENZODIAZ UR QL SCN: NEGATIVE
BILIRUBIN DIRECT+TOT PNL SERPL-MCNC: 0.3 MG/DL
BUN SERPL-MCNC: 9.7 MG/DL (ref 7–18.7)
CALCIUM SERPL-MCNC: 8.9 MG/DL (ref 8.4–10.2)
CANNABINOIDS UR QL SCN: NEGATIVE
CHLORIDE SERPL-SCNC: 106 MMOL/L (ref 98–107)
CO2 SERPL-SCNC: 21 MMOL/L (ref 22–29)
COCAINE UR QL SCN: NEGATIVE
CREAT SERPL-MCNC: 0.67 MG/DL (ref 0.55–1.02)
CTP QC/QA: YES
EOSINOPHIL # BLD AUTO: 0.05 X10(3)/MCL (ref 0–0.9)
EOSINOPHIL NFR BLD AUTO: 0.5 %
ERYTHROCYTE [DISTWIDTH] IN BLOOD BY AUTOMATED COUNT: 14.6 % (ref 11.5–17)
FENTANYL UR QL SCN: NEGATIVE
GFR SERPLBLD CREATININE-BSD FMLA CKD-EPI: >60 MLS/MIN/1.73/M2
GLOBULIN SER-MCNC: 3.9 GM/DL (ref 2.4–3.5)
GLUCOSE SERPL-MCNC: 67 MG/DL (ref 74–100)
GROUP & RH: NORMAL
HBV SURFACE AG SERPL QL IA: NONREACTIVE
HCT VFR BLD AUTO: 39.6 % (ref 37–47)
HGB BLD-MCNC: 13.1 GM/DL (ref 12–16)
HIV 1+2 AB+HIV1 P24 AG SERPL QL IA: NONREACTIVE
IMM GRANULOCYTES # BLD AUTO: 0.08 X10(3)/MCL (ref 0–0.04)
IMM GRANULOCYTES NFR BLD AUTO: 0.8 %
INDIRECT COOMBS GEL: NORMAL
LYMPHOCYTES # BLD AUTO: 2.12 X10(3)/MCL (ref 0.6–4.6)
LYMPHOCYTES NFR BLD AUTO: 20.9 %
MCH RBC QN AUTO: 33.7 PG (ref 27–31)
MCHC RBC AUTO-ENTMCNC: 33.1 MG/DL (ref 33–36)
MCV RBC AUTO: 101.8 FL (ref 80–94)
MDMA UR QL SCN: NEGATIVE
MONOCYTES # BLD AUTO: 0.6 X10(3)/MCL (ref 0.1–1.3)
MONOCYTES NFR BLD AUTO: 5.9 %
NEUTROPHILS # BLD AUTO: 7.3 X10(3)/MCL (ref 2.1–9.2)
NEUTROPHILS NFR BLD AUTO: 71.6 %
NRBC BLD AUTO-RTO: 0 %
OPIATES UR QL SCN: NEGATIVE
PCP UR QL: NEGATIVE
PH UR: 6 [PH] (ref 3–11)
PLATELET # BLD AUTO: 421 X10(3)/MCL (ref 130–400)
PMV BLD AUTO: 11.4 FL (ref 7.4–10.4)
POTASSIUM SERPL-SCNC: 4.8 MMOL/L (ref 3.5–5.1)
PROT SERPL-MCNC: 6.4 GM/DL (ref 6.4–8.3)
RBC # BLD AUTO: 3.89 X10(6)/MCL (ref 4.2–5.4)
RH IMMUNE GLOBULIN: NORMAL
ROSETTE - FMH (FETAL BLEED SCREEN): NORMAL
RUPTURE OF MEMBRANE: POSITIVE
SODIUM SERPL-SCNC: 139 MMOL/L (ref 136–145)
SPECIFIC GRAVITY, URINE AUTO (.000) (OHS): 1.02 (ref 1–1.03)
T PALLIDUM AB SER QL: REACTIVE
T VAGINALIS CVX QL WET PREP: ABNORMAL
WBC # SPEC AUTO: 10.1 X10(3)/MCL (ref 4.5–11.5)

## 2022-08-24 PROCEDURE — 80307 DRUG TEST PRSMV CHEM ANLYZR: CPT | Performed by: NURSE PRACTITIONER

## 2022-08-24 PROCEDURE — 80053 COMPREHEN METABOLIC PANEL: CPT | Performed by: NURSE PRACTITIONER

## 2022-08-24 PROCEDURE — 3074F PR MOST RECENT SYSTOLIC BLOOD PRESSURE < 130 MM HG: ICD-10-PCS | Mod: CPTII,S$GLB,, | Performed by: NURSE PRACTITIONER

## 2022-08-24 PROCEDURE — 11000001 HC ACUTE MED/SURG PRIVATE ROOM

## 2022-08-24 PROCEDURE — 76819 US MFM PROCEDURE (VIEWPOINT): ICD-10-PCS | Mod: S$GLB,,, | Performed by: OBSTETRICS & GYNECOLOGY

## 2022-08-24 PROCEDURE — 25000003 PHARM REV CODE 250: Performed by: OBSTETRICS & GYNECOLOGY

## 2022-08-24 PROCEDURE — 76820 UMBILICAL ARTERY ECHO: CPT | Mod: S$GLB,,, | Performed by: OBSTETRICS & GYNECOLOGY

## 2022-08-24 PROCEDURE — 72100003 HC LABOR CARE, EA. ADDL. 8 HRS

## 2022-08-24 PROCEDURE — 76816 US MFM PROCEDURE (VIEWPOINT): ICD-10-PCS | Mod: S$GLB,,, | Performed by: OBSTETRICS & GYNECOLOGY

## 2022-08-24 PROCEDURE — 27000492 HC SLEEVE, SCD T/L

## 2022-08-24 PROCEDURE — 76816 OB US FOLLOW-UP PER FETUS: CPT | Mod: S$GLB,,, | Performed by: OBSTETRICS & GYNECOLOGY

## 2022-08-24 PROCEDURE — 72100002 HC LABOR CARE, 1ST 8 HOURS

## 2022-08-24 PROCEDURE — 63600175 PHARM REV CODE 636 W HCPCS: Performed by: OBSTETRICS & GYNECOLOGY

## 2022-08-24 PROCEDURE — 87210 SMEAR WET MOUNT SALINE/INK: CPT | Performed by: OBSTETRICS & GYNECOLOGY

## 2022-08-24 PROCEDURE — 1159F MED LIST DOCD IN RCRD: CPT | Mod: CPTII,S$GLB,, | Performed by: NURSE PRACTITIONER

## 2022-08-24 PROCEDURE — 63700000 PHARM REV CODE 250 ALT 637 W/O HCPCS: Performed by: OBSTETRICS & GYNECOLOGY

## 2022-08-24 PROCEDURE — 87340 HEPATITIS B SURFACE AG IA: CPT | Performed by: NURSE PRACTITIONER

## 2022-08-24 PROCEDURE — 3008F BODY MASS INDEX DOCD: CPT | Mod: CPTII,S$GLB,, | Performed by: NURSE PRACTITIONER

## 2022-08-24 PROCEDURE — 36415 COLL VENOUS BLD VENIPUNCTURE: CPT | Performed by: NURSE PRACTITIONER

## 2022-08-24 PROCEDURE — 86803 HEPATITIS C AB TEST: CPT | Performed by: NURSE PRACTITIONER

## 2022-08-24 PROCEDURE — 87389 HIV-1 AG W/HIV-1&-2 AB AG IA: CPT | Performed by: NURSE PRACTITIONER

## 2022-08-24 PROCEDURE — 63600519 RHOGAM PHARM REV CODE 636 ALT 250 W HCPCS: Performed by: OBSTETRICS & GYNECOLOGY

## 2022-08-24 PROCEDURE — 36415 COLL VENOUS BLD VENIPUNCTURE: CPT | Performed by: OBSTETRICS & GYNECOLOGY

## 2022-08-24 PROCEDURE — 85025 COMPLETE CBC W/AUTO DIFF WBC: CPT | Performed by: NURSE PRACTITIONER

## 2022-08-24 PROCEDURE — 3008F PR BODY MASS INDEX (BMI) DOCUMENTED: ICD-10-PCS | Mod: CPTII,S$GLB,, | Performed by: NURSE PRACTITIONER

## 2022-08-24 PROCEDURE — 87088 URINE BACTERIA CULTURE: CPT | Performed by: OBSTETRICS & GYNECOLOGY

## 2022-08-24 PROCEDURE — 1159F PR MEDICATION LIST DOCUMENTED IN MEDICAL RECORD: ICD-10-PCS | Mod: CPTII,S$GLB,, | Performed by: NURSE PRACTITIONER

## 2022-08-24 PROCEDURE — 3078F DIAST BP <80 MM HG: CPT | Mod: CPTII,S$GLB,, | Performed by: NURSE PRACTITIONER

## 2022-08-24 PROCEDURE — 87081 CULTURE SCREEN ONLY: CPT | Performed by: OBSTETRICS & GYNECOLOGY

## 2022-08-24 PROCEDURE — 86780 TREPONEMA PALLIDUM: CPT | Performed by: NURSE PRACTITIONER

## 2022-08-24 PROCEDURE — 76819 FETAL BIOPHYS PROFIL W/O NST: CPT | Mod: S$GLB,,, | Performed by: OBSTETRICS & GYNECOLOGY

## 2022-08-24 PROCEDURE — 3074F SYST BP LT 130 MM HG: CPT | Mod: CPTII,S$GLB,, | Performed by: NURSE PRACTITIONER

## 2022-08-24 PROCEDURE — 3078F PR MOST RECENT DIASTOLIC BLOOD PRESSURE < 80 MM HG: ICD-10-PCS | Mod: CPTII,S$GLB,, | Performed by: NURSE PRACTITIONER

## 2022-08-24 PROCEDURE — 99204 OFFICE O/P NEW MOD 45 MIN: CPT | Mod: 25,TH,S$GLB, | Performed by: NURSE PRACTITIONER

## 2022-08-24 PROCEDURE — 76820 US MFM PROCEDURE (VIEWPOINT): ICD-10-PCS | Mod: S$GLB,,, | Performed by: OBSTETRICS & GYNECOLOGY

## 2022-08-24 PROCEDURE — 85461 HEMOGLOBIN FETAL: CPT | Performed by: OBSTETRICS & GYNECOLOGY

## 2022-08-24 PROCEDURE — 86901 BLOOD TYPING SEROLOGIC RH(D): CPT | Performed by: OBSTETRICS & GYNECOLOGY

## 2022-08-24 PROCEDURE — 99204 PR OFFICE/OUTPT VISIT, NEW, LEVL IV, 45-59 MIN: ICD-10-PCS | Mod: 25,TH,S$GLB, | Performed by: NURSE PRACTITIONER

## 2022-08-24 PROCEDURE — 86592 SYPHILIS TEST NON-TREP QUAL: CPT | Performed by: NURSE PRACTITIONER

## 2022-08-24 RX ORDER — AZITHROMYCIN 250 MG/1
1000 TABLET, FILM COATED ORAL ONCE
Status: DISCONTINUED | OUTPATIENT
Start: 2022-08-29 | End: 2022-08-26

## 2022-08-24 RX ORDER — SODIUM CHLORIDE, SODIUM LACTATE, POTASSIUM CHLORIDE, CALCIUM CHLORIDE 600; 310; 30; 20 MG/100ML; MG/100ML; MG/100ML; MG/100ML
INJECTION, SOLUTION INTRAVENOUS CONTINUOUS
Status: DISCONTINUED | OUTPATIENT
Start: 2022-08-24 | End: 2022-08-28

## 2022-08-24 RX ORDER — AZITHROMYCIN 250 MG/1
1000 TABLET, FILM COATED ORAL ONCE
Status: COMPLETED | OUTPATIENT
Start: 2022-08-24 | End: 2022-08-24

## 2022-08-24 RX ORDER — SODIUM CHLORIDE 0.9 % (FLUSH) 0.9 %
10 SYRINGE (ML) INJECTION
Status: DISCONTINUED | OUTPATIENT
Start: 2022-08-24 | End: 2022-08-28

## 2022-08-24 RX ORDER — AMOXICILLIN 500 MG/1
500 CAPSULE ORAL EVERY 8 HOURS
Status: DISCONTINUED | OUTPATIENT
Start: 2022-08-26 | End: 2022-08-26

## 2022-08-24 RX ORDER — BETAMETHASONE SODIUM PHOSPHATE AND BETAMETHASONE ACETATE 3; 3 MG/ML; MG/ML
12 INJECTION, SUSPENSION INTRA-ARTICULAR; INTRALESIONAL; INTRAMUSCULAR; SOFT TISSUE EVERY 24 HOURS
Status: COMPLETED | OUTPATIENT
Start: 2022-08-24 | End: 2022-08-25

## 2022-08-24 RX ADMIN — SODIUM CHLORIDE, POTASSIUM CHLORIDE, SODIUM LACTATE AND CALCIUM CHLORIDE: 600; 310; 30; 20 INJECTION, SOLUTION INTRAVENOUS at 09:08

## 2022-08-24 RX ADMIN — BETAMETHASONE SODIUM PHOSPHATE AND BETAMETHASONE ACETATE 12 MG: 3; 3 INJECTION, SUSPENSION INTRA-ARTICULAR; INTRALESIONAL; INTRAMUSCULAR at 04:08

## 2022-08-24 RX ADMIN — AMPICILLIN SODIUM 2 G: 2 INJECTION, POWDER, FOR SOLUTION INTRAMUSCULAR; INTRAVENOUS at 09:08

## 2022-08-24 RX ADMIN — AZITHROMYCIN MONOHYDRATE 1000 MG: 250 TABLET ORAL at 04:08

## 2022-08-24 RX ADMIN — SODIUM CHLORIDE, POTASSIUM CHLORIDE, SODIUM LACTATE AND CALCIUM CHLORIDE: 600; 310; 30; 20 INJECTION, SOLUTION INTRAVENOUS at 03:08

## 2022-08-24 RX ADMIN — AMPICILLIN SODIUM 2 G: 2 INJECTION, POWDER, FOR SOLUTION INTRAMUSCULAR; INTRAVENOUS at 04:08

## 2022-08-24 RX ADMIN — HUMAN RHO(D) IMMUNE GLOBULIN 300 MCG: 1500 SOLUTION INTRAMUSCULAR; INTRAVENOUS at 10:08
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
## 2022-08-24 NOTE — ASSESSMENT & PLAN NOTE
She reports a history of methamphetamine use with reported last use on 22. She had a positive UDS on 22 for benzo, amphetamine, and opiates.     Children exposed to illicit drugs in utero may have associated fetal/ risks that include the following: spontaneous , placental abruption, low birth weight, oligohydramnios, non-reassuring fetal status, pregnancy loss,  withdrawal syndrome, sudden infant death syndrome (SIDS) and/or intellectual disabilities.

## 2022-08-24 NOTE — CONSULTS
I was asked to speak with Ms. Ramos by Dr. Cheney to discuss probably  delivery between 32-34 weeks gestational age. Briefly, Ms. Ramos is a 30 yo  female currently undergoing inpatient management for PPROM.  This pregnancy is also complicated by fetal growth restriction (followed by M--Dr. Salazar) and maternal tobacco and drug use (recent urine tox screens positive for opiates/fentanyl, amphetamine, and benzodiazepines). Oligohydramnios noted on ultrasound today with positive ROM testing on admission. Estimated fetal weight is 1582g (9%ile). Ms. Ramos is receiving latency antibiotics and has received her first dose of betamethasone. Plan is for delivery at 34 weeks as maternal/fetal status allows.    I discussed various aspects of  delivery at 32-34 weeks including the incidence of TTN and RDS/surfactant deficiency and the possible need for respiratory support from supplemental oxygen to intubation/mechanical ventilation/exogenous surfactant administration. I discussed typical issues of prematurity at this gestational age including thermoregulation, glucose control, and feeding adaptation. I also discussed that intrauterine growth restriction and maternal substance use may complicate/prolong the typical course of these issues. We also touched on the possibility of  abstinence syndrome and the need for pharmacologic therapy with morphine if symptoms develop.  I briefly went over discharge criteria and gave her expected due date as an estimated discharge date, although some infants born between 32-34 weeks may be ready for discharge earlier. Ms. Ramos voiced understanding of our conversation and had no further questions after the above had been discussed.  A representative from the NICU can be available for further discussion should additional questions arise.     Darling Cruz MD  Neonatology    55 minutes spent in consultation.

## 2022-08-24 NOTE — ASSESSMENT & PLAN NOTE
A new diagnosis of fetal growth restriction is established today in the presence of oligohydramnios. She reports having a small amount of vaginal leaking this morning after bathing.     In the setting of FGR, we will admit for inpatient management. We will perform a ROMPlus test to rule out PROM.

## 2022-08-24 NOTE — ASSESSMENT & PLAN NOTE
FGR: The patients fetus has been diagnosed with FGR based on EFW < 10th percentile/AC < 10th percentile. Potential etiologies of FGR include but are not limited to normal variation of stature, placental insufficiency, chromosomal abnormalities, genetic disorders, infections, medical conditions, teratogen exposure and other etiologies. She does report a history of methamphetamine usage. She had a positive UDS on 7/12/22 for benzo, amphetamines, and opiates. Pregnancies complicated by FGR are at increased risk of stillbirth. We discussed delivery timing and recommendations for antepartum testing.     8/24/22: overall EFW 8.7%, AC 11%. BPP 6/8 for fluid. SDP 1.33 x 1.18cm (total KORY 3.19cm). Umbilical artery dopplers demonstrate diastolic flow and normal S/D ratio  With a new diagnosis of FGR in the presence of oligohydramnios, recommend inpatient management with plan to deliver at 34 weeks if maternal and fetal status allows. Will provide further orders once inpatient.

## 2022-08-25 PROBLEM — O98.113 SYPHILIS AFFECTING PREGNANCY IN THIRD TRIMESTER: Status: ACTIVE | Noted: 2022-07-13

## 2022-08-25 LAB
C TRACH DNA SPEC QL NAA+PROBE: NOT DETECTED
CREAT UR-MCNC: 35.6 MG/DL (ref 47–110)
FLUAV AG UPPER RESP QL IA.RAPID: NOT DETECTED
FLUBV AG UPPER RESP QL IA.RAPID: NOT DETECTED
HCV AB SERPL QL IA: NONREACTIVE
N GONORRHOEA DNA SPEC QL NAA+PROBE: NOT DETECTED
PROT UR STRIP-MCNC: 8 MG/DL
PROT/CREAT UR-RTO: 224.7 MG/GM CR
RPR SER QL: ABNORMAL
RPR SER QL: REACTIVE
RPR SER-TITR: ABNORMAL {TITER}
RSV A 5' UTR RNA NPH QL NAA+PROBE: NOT DETECTED
SARS-COV-2 RNA RESP QL NAA+PROBE: NOT DETECTED

## 2022-08-25 PROCEDURE — 82570 ASSAY OF URINE CREATININE: CPT | Performed by: NURSE PRACTITIONER

## 2022-08-25 PROCEDURE — 87491 CHLMYD TRACH DNA AMP PROBE: CPT | Performed by: OBSTETRICS & GYNECOLOGY

## 2022-08-25 PROCEDURE — 25000003 PHARM REV CODE 250: Performed by: OBSTETRICS & GYNECOLOGY

## 2022-08-25 PROCEDURE — 87591 N.GONORRHOEAE DNA AMP PROB: CPT | Performed by: OBSTETRICS & GYNECOLOGY

## 2022-08-25 PROCEDURE — 99223 PR INITIAL HOSPITAL CARE,LEVL III: ICD-10-PCS | Mod: ,,, | Performed by: NURSE PRACTITIONER

## 2022-08-25 PROCEDURE — 27000207 HC ISOLATION

## 2022-08-25 PROCEDURE — 11000001 HC ACUTE MED/SURG PRIVATE ROOM

## 2022-08-25 PROCEDURE — 99223 1ST HOSP IP/OBS HIGH 75: CPT | Mod: ,,, | Performed by: NURSE PRACTITIONER

## 2022-08-25 PROCEDURE — 87636 SARSCOV2 & INF A&B AMP PRB: CPT | Performed by: OBSTETRICS & GYNECOLOGY

## 2022-08-25 PROCEDURE — 63600175 PHARM REV CODE 636 W HCPCS: Mod: JG | Performed by: OBSTETRICS & GYNECOLOGY

## 2022-08-25 PROCEDURE — 72100003 HC LABOR CARE, EA. ADDL. 8 HRS

## 2022-08-25 PROCEDURE — 81001 URINALYSIS AUTO W/SCOPE: CPT | Performed by: OBSTETRICS & GYNECOLOGY

## 2022-08-25 PROCEDURE — 36415 COLL VENOUS BLD VENIPUNCTURE: CPT | Performed by: OBSTETRICS & GYNECOLOGY

## 2022-08-25 PROCEDURE — 63600175 PHARM REV CODE 636 W HCPCS: Performed by: OBSTETRICS & GYNECOLOGY

## 2022-08-25 RX ORDER — ACETAMINOPHEN 325 MG/1
650 TABLET ORAL EVERY 6 HOURS PRN
Status: DISCONTINUED | OUTPATIENT
Start: 2022-08-25 | End: 2022-08-30 | Stop reason: HOSPADM

## 2022-08-25 RX ORDER — CALCIUM CARBONATE 200(500)MG
500 TABLET,CHEWABLE ORAL 3 TIMES DAILY PRN
Status: DISCONTINUED | OUTPATIENT
Start: 2022-08-25 | End: 2022-08-28

## 2022-08-25 RX ORDER — FAMOTIDINE 20 MG/1
20 TABLET, FILM COATED ORAL 2 TIMES DAILY
Status: DISCONTINUED | OUTPATIENT
Start: 2022-08-25 | End: 2022-08-28

## 2022-08-25 RX ORDER — DIPHENHYDRAMINE HCL 25 MG
25 CAPSULE ORAL EVERY 6 HOURS PRN
Status: DISCONTINUED | OUTPATIENT
Start: 2022-08-25 | End: 2022-08-28

## 2022-08-25 RX ADMIN — PENICILLIN G BENZATHINE 2.4 MILLION UNITS: 1200000 INJECTION, SUSPENSION INTRAMUSCULAR at 11:08

## 2022-08-25 RX ADMIN — BETAMETHASONE SODIUM PHOSPHATE AND BETAMETHASONE ACETATE 12 MG: 3; 3 INJECTION, SUSPENSION INTRA-ARTICULAR; INTRALESIONAL; INTRAMUSCULAR at 04:08

## 2022-08-25 RX ADMIN — ACETAMINOPHEN 650 MG: 325 TABLET, FILM COATED ORAL at 04:08

## 2022-08-25 RX ADMIN — DIPHENHYDRAMINE HYDROCHLORIDE 25 MG: 25 CAPSULE ORAL at 07:08

## 2022-08-25 RX ADMIN — AMPICILLIN SODIUM 2 G: 2 INJECTION, POWDER, FOR SOLUTION INTRAMUSCULAR; INTRAVENOUS at 10:08

## 2022-08-25 RX ADMIN — AMPICILLIN SODIUM 2 G: 2 INJECTION, POWDER, FOR SOLUTION INTRAMUSCULAR; INTRAVENOUS at 09:08

## 2022-08-25 RX ADMIN — FAMOTIDINE 20 MG: 20 TABLET, FILM COATED ORAL at 03:08

## 2022-08-25 RX ADMIN — AMPICILLIN SODIUM 2 G: 2 INJECTION, POWDER, FOR SOLUTION INTRAMUSCULAR; INTRAVENOUS at 04:08

## 2022-08-25 NOTE — PROGRESS NOTES
Antepartum Progress Note        Subjective:      Patient currently doing well without complaints. She reported some ctx pain earlier this am. She reports good fetal movement, denies vaginal bleeding, continues to report some LOF.    Objective:      Temp:  [97.7 °F (36.5 °C)-98.9 °F (37.2 °C)] 98.1 °F (36.7 °C)  Pulse:  [] 88  Resp:  [17-20] 17  SpO2:  [95 %-100 %] 96 %  BP: (121-141)/(60-81) 124/74  There is no height or weight on file to calculate BMI.     General: no acute distress  Electronic Fetal Monitoring:  FHT: 145 bpm, moderate variability, accelerations present, decelerations absent   Category: 1                 TOCO: Contractions: none   Cervix:deferred     P/C 224  GCCT pending  Quant GOLD pending  COVID/FLU/RSV neg x3    RPR 1:8  Hep B neg Hep C neg HIV neg  O neg  Rubella IgG pending  GBS prelim neg  Neg trichomonas  UCx pending  UDS amphetamines  Romplus +++  Assessment:     1. IUP at  here for PPROM  2.   Group & Rh   Date Value Ref Range Status   2022 O NEG  Final     3. Category 1 FHT     Patient Active Problem List   Diagnosis    Depressive disorder    Tobacco user    Generalized anxiety disorder    Gastritis    Syphilis affecting pregnancy in third trimester    Rh negative state in antepartum period    Intrauterine growth restriction affecting antepartum care of mother in third trimester    Drug abuse during pregnancy    Oligohydramnios antepartum, third trimester, not applicable or unspecified fetus    Pregnancy with 32 completed weeks gestation     premature rupture of membranes (PPROM) with unknown onset of labor    Pregnancy affected by fetal growth restriction    Substance abuse affecting pregnancy in third trimester, antepartum       Plan:     1. Continue management of PPROM with latency abx, VSSAF, fetal status reassing  2. BMZ series  3. Reassuring FHT  4. Deliver for labor, abruption, chorioamnionitis. Vaginal delivery candidate.  5. Rh negative--  pt to receive Rhogam this admission  6. Hx incarceration with cough-- CXR negative and neg COVID panel and Quantiferon gold pending, AFB smear sputum and she is on isolation precautions. Suspect chronic lung disease with significant smoking hx (tobacco/methamphetamines)  7. Prelim GBS negative, f/u final, growth sono 9th% cephalic  8. Hx substance abuse-- UDS pos amphetamines this admit (hx +opiates/fentanyl/benzo/THC in past)  Social work consult  9. Hx syphilis treated in May-- was treated per Public Health record 6/13, 6/22, 6/29 with bicillin. Reported still having intercourse with partner with unknown treatment and 1:8 titer yesterday, bicillin x3 weekly doses ordered. f/u GCCT and trich/HCV/HepB/HIV resulted as negative yesterday  10. Appreciate [facility] recommendations    Marilyn Boone MD  OB/GYN Hospitalist  8/25/20229:18 AM

## 2022-08-25 NOTE — NURSING
"Pulse ox cable and wiring changed out multiple times, fetal monitor reads "repair." No other room available to place patient in due to unit being full. Ticket put in to clinical engineering.  "

## 2022-08-25 NOTE — CONSULTS
Consultation Note  Maternal Fetal Medicine          Subjective:         Nancy Ramos is a 31 y.o.  female with dean IUP at 32w5d. She transferred primary OB care to the [facility] family medicine clinic at ~26 weeks. She previously came to our [facility] office for an anatomy ultrasound (without consult). She presented yesterday (22) to our office for follow up ultrasound to complete anatomical views and growth assessment. Upon yesterdays ultrasound evaluation, fetal growth restriction with oligohydramnios was diagnosed. She stated, then, that she had been leaking amniotic fluid since the morning of 22. She was sent from our office yesterday for inpatient management.   Of note, she reports a history of drug usage (methamphetamine) with reported last use on 22. However, she reported to nursing staff she last used a few days ago. She had a positive UDS on 22 for benzo, amphetamine, and opiates.      Upon arrival to the hospital yesterday, ROMPlus testing was positive. Prenatal lab profile was collected since it had not been completed as of yet. Syphilis Ab +. Reflexed to RPR/titer pending. UDS +amphetamines.   This morning, she reports still leaking a scant amount of clear fluid that is blood tinged. No fluid or blood noted on chux pad that she is currently laying on. She reports bilateral lower abd cramping. Reports + fetal movement. When discussed syphilis Ab returning positive and currently awaiting further testing, she reports being initially diagnosed with syphilis when she was incarcerated ~May of this year. She reports receiving 3 injections through the Health Unit/[facility]. She reports one sexual partner and states she thinks he got treated for syphilis, as well.      Review of Systems   Constitutional: Negative for fever, chills, malaise, and fatigue.   HENT: Negative for headache.   Eyes: Negative for visual disturbances.   Respiratory: Negative for shortness of breath or difficulty  breathing. Positive for chronic cough.   Cardiovascular: Negative for chest pain, edema.  Gastrointestinal:  Negative for abd pain/RUQ pain, N/V/D. Negative for constipation.   Obstetrical: Positive for vaginal leaking- clear fluid with tinge of blood. Positive fetal movement. Positive for lower abd cramping, bilaterally.  Genitourinary: Negative for dysuria, frequency, urgency.   Musculoskeletal: Negative.    Neurological: Negative.    Psychiatric/Behavioral: Negative.      PMHx:   Past Medical History:   Diagnosis Date    Hyperlipidemia     Mental disorder     depression, anxiety    Trauma        PSHx: No past surgical history on file.    All:   Review of patient's allergies indicates:   Allergen Reactions    Quetiapine Anaphylaxis    Iodine     Shellfish derived      and fish    Sulfamethoxazole-trimethoprim        Meds:   Medications Prior to Admission   Medication Sig Dispense Refill Last Dose    lansoprazole (PREVACID 24HR) 15 MG capsule Take 1 capsule (15 mg total) by mouth once daily. 30 capsule 1 2022 at 0800    prenatal 25/iron fum/folic/dha (PRENATAL-1 ORAL) Take 1 tablet by mouth once daily.   2022 at 0800       SH:   Social History     Socioeconomic History    Marital status: Single   Tobacco Use    Smoking status: Current Some Day Smoker     Packs/day: 0.25     Types: Cigarettes    Smokeless tobacco: Never Used   Substance and Sexual Activity    Alcohol use: Not Currently    Drug use: Yes     Types: Methamphetamines     Comment: last use 3d ago    Sexual activity: Not Currently     Partners: Male       FH:   Family History   Problem Relation Age of Onset    Emphysema Father     Lung cancer Father     Throat cancer Father     COPD Father     COPD Mother     Lung cancer Mother     Emphysema Mother        OBHx:   OB History    Para Term  AB Living   1 0 0 0 0 0   SAB IAB Ectopic Multiple Live Births   0 0 0 0 0      # Outcome Date GA Lbr Brooks/2nd Weight Sex  Delivery Anes PTL Lv   1 Current                Objective:         Temp:  [97.7 °F (36.5 °C)-98.5 °F (36.9 °C)] 98.4 °F (36.9 °C)  Pulse:  [] 101  Resp:  [17-20] 17  SpO2:  [95 %-100 %] 100 %  BP: (121-141)/(60-81) 127/60     She has had two systolic BPs of >/= 140.    Gen: Appears disheveled. A&Ox3  Pulm: Unlabored breathing. Rhonchi, expiratory wheezing bilaterally. Actively coughing while at bedside.  Card: RRR  Abd: FHT present, soft, nondistended, nontender to palpation, gravid uterus palpable c/w gestational age  Extremities: Palpable peripheral pulses, no pedal edema, 2+ DTRs x 4  No fluid or blood noted on chux pad under buttocks  ROMPlus positive 8/24/22  NST: 135 baseline, moderate BTBV, pos accelerations, few intermittent decelerations with spontaneous recovery  [place]:  Tocometer adjusted. Mild contraction palpated  US (8/24/22 @ [facility] office): vtx presentation, post placenta, SDP 1.33 x 1.18cm, BPP 6/8 for fluid, EFW 1582g (8.7%). Umbilical artery dopplers demonstrated diastolic flow with normal S/D ratio    Lab Review  Blood Type: O NEG     Recent Results (from the past 24 hour(s))   POCT Rupture of membrane    Collection Time: 08/24/22  2:35 PM   Result Value Ref Range    Rupture of Membrane Positive (A) Negative     Acceptable Yes    Drug Screen, Urine    Collection Time: 08/24/22  3:14 PM   Result Value Ref Range    Amphetamines, Urine Positive (A) Negative    Barbituates, Urine Negative Negative    Benzodiazepine, Urine Negative Negative    Cannabinoids, Urine Negative Negative    Cocaine, Urine Negative Negative    Fentanyl, Urine Negative Negative    MDMA, Urine Negative Negative    Opiates, Urine Negative Negative    Phencyclidine, Urine Negative Negative    pH, Urine 6.0 3.0 - 11.0    Specific Gravity, Urine Auto 1.018 1.001 - 1.035   Comprehensive metabolic panel    Collection Time: 08/24/22  3:15 PM   Result Value Ref Range    Sodium Level 139 136 - 145 mmol/L    Potassium  Level 4.8 3.5 - 5.1 mmol/L    Chloride 106 98 - 107 mmol/L    Carbon Dioxide 21 (L) 22 - 29 mmol/L    Glucose Level 67 (L) 74 - 100 mg/dL    Blood Urea Nitrogen 9.7 7.0 - 18.7 mg/dL    Creatinine 0.67 0.55 - 1.02 mg/dL    Calcium Level Total 8.9 8.4 - 10.2 mg/dL    Protein Total 6.4 6.4 - 8.3 gm/dL    Albumin Level 2.5 (L) 3.5 - 5.0 gm/dL    Globulin 3.9 (H) 2.4 - 3.5 gm/dL    Albumin/Globulin Ratio 0.6 (L) 1.1 - 2.0 ratio    Bilirubin Total 0.3 <=1.5 mg/dL    Alkaline Phosphatase 210 (H) 40 - 150 unit/L    Alanine Aminotransferase 13 0 - 55 unit/L    Aspartate Aminotransferase 19 5 - 34 unit/L    eGFR >60 mls/min/1.73/m2   Type & Screen    Collection Time: 08/24/22  3:15 PM   Result Value Ref Range    Group & Rh O NEG     Indirect Tamera GEL NEG    SYPHILIS ANTIBODY (WITH REFLEX RPR)    Collection Time: 08/24/22  3:15 PM   Result Value Ref Range    Syphilis Antibody Reactive (A) Nonreactive, Equivocal   HIV 1/2 Ag/Ab (4th Gen)    Collection Time: 08/24/22  3:15 PM   Result Value Ref Range    HIV Nonreactive Nonreactive   Hepatitis B surface antigen    Collection Time: 08/24/22  3:15 PM   Result Value Ref Range    Hepatitis B Surface Antigen Nonreactive Nonreactive   Hepatitis C Antibody    Collection Time: 08/24/22  3:15 PM   Result Value Ref Range    Hepatitis C Antibody Nonreactive Nonreactive   CBC with Differential    Collection Time: 08/24/22  3:15 PM   Result Value Ref Range    WBC 10.1 4.5 - 11.5 x10(3)/mcL    RBC 3.89 (L) 4.20 - 5.40 x10(6)/mcL    Hgb 13.1 12.0 - 16.0 gm/dL    Hct 39.6 37.0 - 47.0 %    .8 (H) 80.0 - 94.0 fL    MCH 33.7 (H) 27.0 - 31.0 pg    MCHC 33.1 33.0 - 36.0 mg/dL    RDW 14.6 11.5 - 17.0 %    Platelet 421 (H) 130 - 400 x10(3)/mcL    MPV 11.4 (H) 7.4 - 10.4 fL    Neut % 71.6 %    Lymph % 20.9 %    Mono % 5.9 %    Eos % 0.5 %    Basophil % 0.3 %    Lymph # 2.12 0.6 - 4.6 x10(3)/mcL    Neut # 7.3 2.1 - 9.2 x10(3)/mcL    Mono # 0.60 0.1 - 1.3 x10(3)/mcL    Eos # 0.05 0 - 0.9  x10(3)/mcL    Baso # 0.03 0 - 0.2 x10(3)/mcL    IG# 0.08 (H) 0 - 0.04 x10(3)/mcL    IG% 0.8 %    NRBC% 0.0 %   RPR    Collection Time: 22  3:15 PM   Result Value Ref Range    RPR Reactive (A) Non-Reactive    RPR Titer 1:8 (A) (none)    RPR #     Trichomonas Prep Wet [place]    Collection Time: 22  3:24 PM    Specimen: Cervical; Cervix   Result Value Ref Range    TRICHOMONAS([facility]) Negative: No Trichomonas vaginalis observed (A) Gram positive cocci, Gram positive bacilli, Gram negative cocci, Gram negative bacilli, Pleomorphic gram negative bacilli, Mixed radha, No polymorphonuclear leukocytes seen, Gram positive cocci in clusters, Gram positive cocci in pairs and chains, Cur...   Strep Only Culture    Collection Time: 22  5:41 PM    Specimen: Vagina   Result Value Ref Range    Strep Only Culture No growth of Beta Strep    [facility] - [facility] (Fetal Bleed Screen)    Collection Time: 22  6:53 PM   Result Value Ref Range    [facility] - [facility] (Fetal Bleed Screen) NEG    Prepare Rh Immune Globulin    Collection Time: 22 10:36 PM   Result Value Ref Range    Rh Immune Glogulin [id number] Issued        Assessment:       31 y.o.  at 32w5d weeks gestation admitted for PPROM, FGR, illicit drug use, history syphilis    Active Hospital Problems    Diagnosis  POA    * premature rupture of membranes (PPROM) with unknown onset of labor [O42.919]  Unknown    Pregnancy with 32 completed weeks gestation [Z3A.32]  Not Applicable    Pregnancy affected by fetal growth restriction [O36.5990]  Unknown    Substance abuse affecting pregnancy in third trimester, antepartum [O99.323]  Unknown      Resolved Hospital Problems   No resolved problems to display.        Plan:          1. PPROM- Expectant management. NICU consult completed,  corticosteroids and DVT prophylaxis in progress. Continue latency antibiotics as ordered. Vigilance for s/s infection.    2. FGR- Continuous EFM monitoring. BPP with  umbilical artery dopplers once weekly (completed 8/24/22). If abnormal dopplers/NRFHTs, would move to delivery at that time.     3. Adventitious lung sounds-  Patient was incarcerated over the summer. She reports always having a cough due to smoking cigarettes and meth. She does not feel as though this is any different than what she always has. She is afebrile and denies shortness of breath. No distress noted. Normal SpO2. CXR ordered. Consider COVID and TB testing due to recent incarceration.    4. Syphilis- Initially diagnosed ~May 2022 while incarcerated and treated with Bicillin x 3 per patient. Will attempt to obtain initial titer and confirmation of Bicillin x 3 from the health unit. Follow titer once resulted to determine if retreatment is needed. Briefly discussed the possibility of transplacental transmission to her baby. NICU notified.     5. Elevated systolic BP x 2 - With diagnosis of FGR, increased risk for development of GHTN/pre-eclampsia. Urine PCR ordered. Normal serum creatinine, LFTs, and PLT on 8/25/22. No neuro symptoms of pre-e currently.    Plan to deliver at 34 weeks unless fetal/maternal indications arise sooner.       Patient's current status, diagnostic testing, assessment, and plan of care discussed in full with Dr Salazar who agrees with all. The plan of care has also been discussed with the primary OB team.   Thank you for allowing us to participate in the care of your patient. If you have any questions/concerns, please do not hesitate to contact us.     Lisa Kennedy, MSN, APRN, WHNP-BC  Maternal Fetal Medicine

## 2022-08-25 NOTE — NURSING
consulted for patient. Updated on poc for patient.  states she will see the patient after delivery.

## 2022-08-26 LAB
APPEARANCE UR: CLEAR
BACTERIA #/AREA URNS AUTO: ABNORMAL /HPF
BACTERIA SPEC CULT: ABNORMAL
BILIRUB UR QL STRIP.AUTO: NEGATIVE MG/DL
C TRACH RRNA SPEC QL NAA+PROBE: NEGATIVE
COLOR UR AUTO: YELLOW
GLUCOSE UR QL STRIP.AUTO: NEGATIVE MG/DL
KETONES UR QL STRIP.AUTO: ABNORMAL MG/DL
LEUKOCYTE ESTERASE UR QL STRIP.AUTO: ABNORMAL UNIT/L
N GONORRHOEA RRNA SPEC QL NAA+PROBE: NEGATIVE
NITRITE UR QL STRIP.AUTO: NEGATIVE
PH UR STRIP.AUTO: 6.5 [PH]
PROT UR QL STRIP.AUTO: NEGATIVE MG/DL
RBC #/AREA URNS AUTO: <5 /HPF
RBC UR QL AUTO: ABNORMAL UNIT/L
RUBV IGG SERPL IA-ACNC: 0.7
RUBV IGG SERPL QL IA: NEGATIVE
SP GR UR STRIP.AUTO: 1.01 (ref 1–1.03)
SPECIMEN SOURCE: NORMAL
SPECIMEN SOURCE: NORMAL
SQUAMOUS #/AREA URNS AUTO: 8 /HPF
UROBILINOGEN UR STRIP-ACNC: 0.2 MG/DL
WBC #/AREA URNS AUTO: 13 /HPF

## 2022-08-26 PROCEDURE — 27000207 HC ISOLATION

## 2022-08-26 PROCEDURE — 25000003 PHARM REV CODE 250: Performed by: SPECIALIST

## 2022-08-26 PROCEDURE — 25000003 PHARM REV CODE 250: Performed by: OBSTETRICS & GYNECOLOGY

## 2022-08-26 PROCEDURE — 11000001 HC ACUTE MED/SURG PRIVATE ROOM

## 2022-08-26 PROCEDURE — 72100003 HC LABOR CARE, EA. ADDL. 8 HRS

## 2022-08-26 PROCEDURE — 99233 SBSQ HOSP IP/OBS HIGH 50: CPT | Mod: ,,, | Performed by: NURSE PRACTITIONER

## 2022-08-26 PROCEDURE — 99233 PR SUBSEQUENT HOSPITAL CARE,LEVL III: ICD-10-PCS | Mod: ,,, | Performed by: NURSE PRACTITIONER

## 2022-08-26 PROCEDURE — 63600175 PHARM REV CODE 636 W HCPCS: Performed by: OBSTETRICS & GYNECOLOGY

## 2022-08-26 PROCEDURE — S4991 NICOTINE PATCH NONLEGEND: HCPCS | Performed by: SPECIALIST

## 2022-08-26 RX ORDER — AZITHROMYCIN 250 MG/1
1000 TABLET, FILM COATED ORAL ONCE
Status: DISCONTINUED | OUTPATIENT
Start: 2022-08-29 | End: 2022-08-28

## 2022-08-26 RX ORDER — NICOTINE 7MG/24HR
1 PATCH, TRANSDERMAL 24 HOURS TRANSDERMAL DAILY
Status: DISCONTINUED | OUTPATIENT
Start: 2022-08-26 | End: 2022-08-30 | Stop reason: HOSPADM

## 2022-08-26 RX ORDER — AMOXICILLIN 500 MG/1
500 CAPSULE ORAL
Status: DISCONTINUED | OUTPATIENT
Start: 2022-08-26 | End: 2022-08-28

## 2022-08-26 RX ADMIN — FAMOTIDINE 20 MG: 20 TABLET, FILM COATED ORAL at 08:08

## 2022-08-26 RX ADMIN — AMOXICILLIN 500 MG: 500 CAPSULE ORAL at 03:08

## 2022-08-26 RX ADMIN — FAMOTIDINE 20 MG: 20 TABLET, FILM COATED ORAL at 09:08

## 2022-08-26 RX ADMIN — NICOTINE 1 PATCH: 7 PATCH, EXTENDED RELEASE TRANSDERMAL at 06:08

## 2022-08-26 RX ADMIN — DIPHENHYDRAMINE HYDROCHLORIDE 25 MG: 25 CAPSULE ORAL at 06:08

## 2022-08-26 RX ADMIN — AMPICILLIN SODIUM 2 G: 2 INJECTION, POWDER, FOR SOLUTION INTRAMUSCULAR; INTRAVENOUS at 03:08

## 2022-08-26 RX ADMIN — AMOXICILLIN 500 MG: 500 CAPSULE ORAL at 11:08

## 2022-08-26 RX ADMIN — SODIUM CHLORIDE, POTASSIUM CHLORIDE, SODIUM LACTATE AND CALCIUM CHLORIDE: 600; 310; 30; 20 INJECTION, SOLUTION INTRAVENOUS at 04:08

## 2022-08-26 RX ADMIN — AMPICILLIN SODIUM 2 G: 2 INJECTION, POWDER, FOR SOLUTION INTRAMUSCULAR; INTRAVENOUS at 10:08

## 2022-08-26 RX ADMIN — ACETAMINOPHEN 650 MG: 325 TABLET, FILM COATED ORAL at 04:08

## 2022-08-26 NOTE — PROGRESS NOTES
Consultation Note  Maternal Fetal Medicine          Subjective:   She reports sleeping well throughout the night. She is without complaints as of today. Specifically, she denies abd pain/contractions, malaise, vaginal bleeding, headaches, visual disturbances, epigastric/RUQ pain. Endorses some scant, clear vaginal fluid throughout the night. Reports positive fetal movement.     Nursing staff without concerns.     Objective:         Temp:  [98.1 °F (36.7 °C)-99.1 °F (37.3 °C)] 98.9 °F (37.2 °C)  Pulse:  [] 78  Resp:  [18-20] 18  SpO2:  [96 %-97 %] 97 %  BP: (121-152)/(68-85) 152/82    Gen: appears dishelved, A&Ox3  Pulm: Unlabored breathing, expiratory wheezing to lower lobes bilaterally  Card: RRR  Abd: FHT present, soft, nondistended, nontender to palpation, gravid uterus palpable c/w gestational age  Extremities: Palpable peripheral pulses, no pedal edema, 2+ DTRs x 4  Genitourinary: No fluid/blood noted on peripad.    NST: 150 baseline, moderate BTBV, pos accelerations, rare variable decelerations with spontaneous recovery  [place]: no uterine activity    Lab Review  Blood Type: O NEG    Recent Results (from the past 24 hour(s))   Urinalysis, Reflex to Urine Culture Urine, Clean Catch    Collection Time: 08/25/22 12:44 PM    Specimen: Urine   Result Value Ref Range    Color, UA Yellow Yellow, Colorless, Other, Clear    Appearance, UA Clear Clear    Specific Gravity, UA 1.008 1.001 - 1.030    pH, UA 6.5 5.0, 5.5, 6.0, 6.5, 7.0, 7.5, 8.0, 8.5    Protein, UA Negative Negative, 300  mg/dL    Glucose, UA Negative Negative, Normal mg/dL    Ketones, UA Trace (A) Negative, +1, +2, +3, +4, +5, >=160, >=80 mg/dL    Blood, UA Trace (A) Negative unit/L    Bilirubin, UA Negative Negative mg/dL    Urobilinogen, UA 0.2 0.2, 1.0, Normal mg/dL    Nitrites, UA Negative Negative    Leukocyte Esterase, UA 1+ (A) Negative, 75  unit/L   Protein/Creatinine Ratio, Urine    Collection Time: 08/25/22 12:44 PM   Result Value Ref Range     Urine Protein Level 8.0 mg/dL    Urine Creatinine 35.6 (L) 47.0 - 110.0 mg/dL    Urine Protein/Creatinine Ratio 224.7 (H) <=200.0 mg/gm Cr   Urinalysis, Microscopic    Collection Time: 22 12:44 PM   Result Value Ref Range    RBC, UA <5 <=5 /HPF    WBC, UA 13 (H) <=5 /HPF    Squamous Epithelial Cells, UA 8 (H) <=5 /HPF    Bacteria, UA None Seen None Seen, Rare, Occasional /HPF       Assessment:       31 y.o.  at 32w6d weeks gestation admitted on 22 with FGR, PPROM, drug use, and syphilis.      Active Hospital Problems    Diagnosis  POA    * premature rupture of membranes (PPROM) with unknown onset of labor [O42.919]  Yes    Pregnancy with 32 completed weeks gestation [Z3A.32]  Not Applicable    Pregnancy affected by fetal growth restriction [O36.5990]  Yes    Substance abuse affecting pregnancy in third trimester, antepartum [O99.323]  Yes    Syphilis affecting pregnancy in third trimester [O98.113]  Yes     Titer 1:8 on 22    Bicillin 2.4mg IM q week x3 doses (pt will deliver before treatment complete)        Resolved Hospital Problems   No resolved problems to display.        Plan:          1. PPROM- Continue latency ABX, DVT prophylaxis as ordered. S/P antepartum corticosteroids, NICU consult. She remains afebrile. Vigilance for s/s infection. Plan to deliver at 34 weeks unless PTL, chorio, or NRFHTs.   2. FGR- Continue EFM monitoring. BPP/UAD once weekly (completed this week). If abnormal dopplers encountered, would proceed to delivery at that time.   3. Lung sounds - CXR clear. Patient in no distress. SpO2 normal on room air. Covid, Flu negative. TB pending. Currently in isolation precautions while results pending. Likely related to chronic cigarette and methamphetamine use.   4. Drug use- Social service consult placed.   5. Syphilis- In process of re-treatment with Bicillin weekly x 3. Patient does not know if sexual partner was treated.   6. Blood pressures - Urine PCR  0.22 (equivalent to 248mg/24h). Normal serum creatinine/LFTs. . She is without symptomatology of pre-eclampsia. Vigilance for s/s. If severe range BPs encountered, would move to delivery at that time.     We will periodically follow through hospital course.   Thank you for allowing us to participate in the care of your patient. If you have any questions/concerns, please do not hesitate to contact us.     Lisa Kennedy MSN, APRN, WHNP-BC  Maternal Fetal Medicine

## 2022-08-26 NOTE — PROGRESS NOTES
Attempted to meet with patient, yet patient is currently in isolation at this time. I informed nurse. I will follow-up with patient Monday to complete initial assessment.

## 2022-08-26 NOTE — PROGRESS NOTES
Antepartum Progress Note        Subjective:      Patient currently doing well without complaints. She denies leakage of fluid today, as well as vaginal bleeding or contractions. Reports good fetal movement.     Objective:      Temp:  [98.1 °F (36.7 °C)-99.1 °F (37.3 °C)] 99.1 °F (37.3 °C)  Pulse:  [] 117  Resp:  [18-20] 18  SpO2:  [96 %-97 %] 97 %  BP: (121-134)/(68-85) 122/85  There is no height or weight on file to calculate BMI.     General: no acute distress  Electronic Fetal Monitoring:  FHT: 155 bpm, moderate variability, accelerations present, decelerations absent   Category: 1                 TOCO: Contractions: none   Cervix:deferred   sono 9th% cephalic presentation    New Labs:   COVID negative  GCCT negative  Quant GOLD pending  Rubella IgG pending  GBS prelim neg  UCx 1+ leukocytes, 13 WBC    Assessment:     1. IUP at  here for PPROM  2.   Group & Rh   Date Value Ref Range Status   2022 O NEG  Final   [facility] Neg  3. Category 1 FHT     Patient Active Problem List   Diagnosis    Depressive disorder    Tobacco user    Generalized anxiety disorder    Gastritis    Syphilis affecting pregnancy in third trimester    Rh negative state in antepartum period    Intrauterine growth restriction affecting antepartum care of mother in third trimester    Drug abuse during pregnancy    Oligohydramnios antepartum, third trimester, not applicable or unspecified fetus    Pregnancy with 32 completed weeks gestation     premature rupture of membranes (PPROM) with unknown onset of labor    Pregnancy affected by fetal growth restriction    Substance abuse affecting pregnancy in third trimester, antepartum       Plan:     - Continue management of PPROM with latency antibiotics with ampicillin then amoxicillin plus azithromycin per MFM recs  - Reassuring FHT  - Bicillin x3 for syphilis   - Betamethasone series completed  - Isolation precautions while Quantiferon gold and AFB smear sputum  pending   - Follow up final, growth of GBS; Prelim GBS negative  - Deliver for labor, abruption, chorioamnionitis. Vaginal delivery candidate.  -  to see patient after delivery for UDS pos amphetamines     Nayeli Stuart MD  [facility] HO-1

## 2022-08-27 PROBLEM — O09.899 RUBELLA NON-IMMUNE STATUS, ANTEPARTUM: Status: ACTIVE | Noted: 2022-08-27

## 2022-08-27 PROBLEM — K29.70 GASTRITIS: Status: RESOLVED | Noted: 2022-07-13 | Resolved: 2022-08-27

## 2022-08-27 PROBLEM — O26.893 RH NEGATIVE STATUS DURING PREGNANCY IN THIRD TRIMESTER: Status: ACTIVE | Noted: 2022-07-13

## 2022-08-27 PROBLEM — R12 HEARTBURN DURING PREGNANCY IN THIRD TRIMESTER: Status: ACTIVE | Noted: 2022-07-13

## 2022-08-27 PROBLEM — O99.333 TOBACCO SMOKING AFFECTING PREGNANCY IN THIRD TRIMESTER: Status: ACTIVE | Noted: 2022-08-27

## 2022-08-27 PROBLEM — B95.1 POSITIVE GBS TEST: Status: ACTIVE | Noted: 2022-08-27

## 2022-08-27 PROBLEM — Z28.39 RUBELLA NON-IMMUNE STATUS, ANTEPARTUM: Status: ACTIVE | Noted: 2022-08-27

## 2022-08-27 LAB
BACTERIA UR CULT: NORMAL
BASOPHILS # BLD AUTO: 0.02 X10(3)/MCL (ref 0–0.2)
BASOPHILS NFR BLD AUTO: 0.2 %
EOSINOPHIL # BLD AUTO: 0 X10(3)/MCL (ref 0–0.9)
EOSINOPHIL NFR BLD AUTO: 0 %
ERYTHROCYTE [DISTWIDTH] IN BLOOD BY AUTOMATED COUNT: 14.4 % (ref 11.5–17)
HCT VFR BLD AUTO: 37.4 % (ref 37–47)
HGB BLD-MCNC: 12.7 GM/DL (ref 12–16)
IMM GRANULOCYTES # BLD AUTO: 0.1 X10(3)/MCL (ref 0–0.04)
IMM GRANULOCYTES NFR BLD AUTO: 0.9 %
LYMPHOCYTES # BLD AUTO: 2.86 X10(3)/MCL (ref 0.6–4.6)
LYMPHOCYTES NFR BLD AUTO: 26.8 %
MCH RBC QN AUTO: 33.9 PG (ref 27–31)
MCHC RBC AUTO-ENTMCNC: 34 MG/DL (ref 33–36)
MCV RBC AUTO: 99.7 FL (ref 80–94)
MONOCYTES # BLD AUTO: 0.75 X10(3)/MCL (ref 0.1–1.3)
MONOCYTES NFR BLD AUTO: 7 %
NEUTROPHILS # BLD AUTO: 6.9 X10(3)/MCL (ref 2.1–9.2)
NEUTROPHILS NFR BLD AUTO: 65.1 %
NRBC BLD AUTO-RTO: 0 %
PLATELET # BLD AUTO: 358 X10(3)/MCL (ref 130–400)
PMV BLD AUTO: 11.2 FL (ref 7.4–10.4)
RBC # BLD AUTO: 3.75 X10(6)/MCL (ref 4.2–5.4)
WBC # SPEC AUTO: 10.7 X10(3)/MCL (ref 4.5–11.5)

## 2022-08-27 PROCEDURE — S4991 NICOTINE PATCH NONLEGEND: HCPCS | Performed by: SPECIALIST

## 2022-08-27 PROCEDURE — 11000001 HC ACUTE MED/SURG PRIVATE ROOM

## 2022-08-27 PROCEDURE — 27000207 HC ISOLATION

## 2022-08-27 PROCEDURE — 25000003 PHARM REV CODE 250: Performed by: OBSTETRICS & GYNECOLOGY

## 2022-08-27 PROCEDURE — 85025 COMPLETE CBC W/AUTO DIFF WBC: CPT | Performed by: OBSTETRICS & GYNECOLOGY

## 2022-08-27 PROCEDURE — 36415 COLL VENOUS BLD VENIPUNCTURE: CPT | Performed by: OBSTETRICS & GYNECOLOGY

## 2022-08-27 PROCEDURE — 72100003 HC LABOR CARE, EA. ADDL. 8 HRS

## 2022-08-27 PROCEDURE — 25000003 PHARM REV CODE 250: Performed by: SPECIALIST

## 2022-08-27 PROCEDURE — 63600175 PHARM REV CODE 636 W HCPCS: Performed by: OBSTETRICS & GYNECOLOGY

## 2022-08-27 RX ORDER — BUTORPHANOL TARTRATE 2 MG/ML
2 INJECTION INTRAMUSCULAR; INTRAVENOUS EVERY 4 HOURS PRN
Status: DISCONTINUED | OUTPATIENT
Start: 2022-08-27 | End: 2022-08-30 | Stop reason: HOSPADM

## 2022-08-27 RX ORDER — BUTORPHANOL TARTRATE 2 MG/ML
INJECTION INTRAMUSCULAR; INTRAVENOUS
Status: COMPLETED
Start: 2022-08-27 | End: 2022-08-28

## 2022-08-27 RX ORDER — BUTORPHANOL TARTRATE 2 MG/ML
1 INJECTION INTRAMUSCULAR; INTRAVENOUS
Status: DISCONTINUED | OUTPATIENT
Start: 2022-08-27 | End: 2022-08-27

## 2022-08-27 RX ADMIN — NICOTINE 1 PATCH: 7 PATCH, EXTENDED RELEASE TRANSDERMAL at 05:08

## 2022-08-27 RX ADMIN — ACETAMINOPHEN 650 MG: 325 TABLET, FILM COATED ORAL at 03:08

## 2022-08-27 RX ADMIN — FAMOTIDINE 20 MG: 20 TABLET, FILM COATED ORAL at 08:08

## 2022-08-27 RX ADMIN — AMOXICILLIN 500 MG: 500 CAPSULE ORAL at 08:08

## 2022-08-27 RX ADMIN — AMOXICILLIN 500 MG: 500 CAPSULE ORAL at 11:08

## 2022-08-27 RX ADMIN — ACETAMINOPHEN 650 MG: 325 TABLET, FILM COATED ORAL at 12:08

## 2022-08-27 RX ADMIN — DIPHENHYDRAMINE HYDROCHLORIDE 25 MG: 25 CAPSULE ORAL at 05:08

## 2022-08-27 RX ADMIN — DIPHENHYDRAMINE HYDROCHLORIDE 25 MG: 25 CAPSULE ORAL at 12:08

## 2022-08-27 RX ADMIN — ACETAMINOPHEN 650 MG: 325 TABLET, FILM COATED ORAL at 09:08

## 2022-08-27 RX ADMIN — AMOXICILLIN 500 MG: 500 CAPSULE ORAL at 04:08

## 2022-08-27 RX ADMIN — BUTORPHANOL TARTRATE 2 MG: 2 INJECTION, SOLUTION INTRAMUSCULAR; INTRAVENOUS at 11:08

## 2022-08-27 RX ADMIN — FAMOTIDINE 20 MG: 20 TABLET, FILM COATED ORAL at 09:08

## 2022-08-27 NOTE — PROGRESS NOTES
Antepartum Progress Note        Subjective:      Patient currently doing well without complaints. Resting comfortably. No contractions or SOA or CP. Denies any fevers or chills.  She reports good fetal movement, denies vaginal bleeding, continues to report some LOF.    Objective:      Temp:  [98 °F (36.7 °C)-99.1 °F (37.3 °C)] 98.9 °F (37.2 °C)  Pulse:  [68-90] 90  Resp:  [16-18] 18  SpO2:  [100 %] 100 %  BP: (126-152)/(66-90) 130/80  There is no height or weight on file to calculate BMI.     General: no acute distress  Electronic Fetal Monitoring:  FHT: 145 bpm, moderate variability, accelerations present, decelerations absent   Category: 1                 TOCO: Contractions: none   Cervix:deferred     P/C 224  GCCT neg x2  Quant GOLD pending  COVID/FLU/RSV neg x3    RPR 1:8  Hep B neg Hep C neg HIV neg  O neg  Rubella IgG NON IMMUNE  GBS POS  Neg trichomonas  Ucx prelim NGTD  UDS amphetamines  Romplus +++    Recent Results (from the past 336 hour(s))   CBC with Differential    Collection Time: 22  6:29 AM   Result Value Ref Range    WBC 10.7 4.5 - 11.5 x10(3)/mcL    Hgb 12.7 12.0 - 16.0 gm/dL    Hct 37.4 37.0 - 47.0 %    Platelet 358 130 - 400 x10(3)/mcL   CBC with Differential    Collection Time: 22  3:15 PM   Result Value Ref Range    WBC 10.1 4.5 - 11.5 x10(3)/mcL    Hgb 13.1 12.0 - 16.0 gm/dL    Hct 39.6 37.0 - 47.0 %    Platelet 421 (H) 130 - 400 x10(3)/mcL       Assessment:     1. IUP at  here for  PPROM  33w0d     Patient Active Problem List   Diagnosis    Depressive disorder    Tobacco user    Generalized anxiety disorder    Syphilis affecting pregnancy in third trimester    Heartburn during pregnancy in third trimester    Intrauterine growth restriction affecting antepartum care of mother in third trimester    Drug abuse during pregnancy    Oligohydramnios antepartum, third trimester, not applicable or unspecified fetus    Pregnancy with 32 completed weeks gestation     premature  rupture of membranes (PPROM) with unknown onset of labor    Pregnancy affected by fetal growth restriction    Substance abuse affecting pregnancy in third trimester, antepartum    Tobacco smoking affecting pregnancy in third trimester    Positive GBS test    Rubella non-immune status, antepartum       Plan:     1. Continue management of PPROM with latency abx, VSSAF , fetal status reassing  2. S/p BMZ series and on latency abx course D#4 today  3. Reassuring FHT  4. Deliver for labor, abruption, chorioamnionitis. Vaginal delivery candidate.  5. Rh negative-- pt received Rhogam and w/u needed postpartum as well as MMR for nonimmune Rubella status  6. Hx incarceration with cough-- CXR negative and neg COVID panel and Quantiferon gold pending, AFB smear sputum (cough is nonproductive) and she is on isolation precautions. Suspect chronic lung disease with significant smoking hx (tobacco/methamphetamines)  7. GBS POS needs ppx in labor, growth sono 9th% cephalic on admit, repeat BPP on Monday/Thurs with overall plan IOL 34 weeks without any concerns per above indications for earlier  8. Hx substance abuse-- UDS pos amphetamines this admit (hx +opiates/fentanyl/benzo/THC in past)  Social work consulted, smoking cessation discussed  9. Hx syphilis treated in May-- was treated per Public Health record 6/13, 6/22, 6/29 with bicillin. Reported still having intercourse with partner with unknown treatment and 1:8 titer yesterday, bicillin x3 weekly doses ordered. GCCT/trich/HCV/HepB/HIV resulted as negative yesterday, f/u final urine culture NGTD prelim  10. Appreciate [facility] recommendations    Marilyn Boone MD  OB/GYN Hospitalist  8/27/20229:18 AM

## 2022-08-28 ENCOUNTER — ANESTHESIA (OUTPATIENT)
Dept: OBSTETRICS AND GYNECOLOGY | Facility: HOSPITAL | Age: 32
End: 2022-08-28
Payer: MEDICAID

## 2022-08-28 ENCOUNTER — ANESTHESIA EVENT (OUTPATIENT)
Dept: OBSTETRICS AND GYNECOLOGY | Facility: HOSPITAL | Age: 32
End: 2022-08-28
Payer: MEDICAID

## 2022-08-28 LAB
ALBUMIN SERPL-MCNC: 2.4 GM/DL (ref 3.5–5)
ALBUMIN/GLOB SERPL: 0.7 RATIO (ref 1.1–2)
ALP SERPL-CCNC: 206 UNIT/L (ref 40–150)
ALT SERPL-CCNC: 12 UNIT/L (ref 0–55)
AMPHET UR QL SCN: NEGATIVE
AMPHET UR QL SCN: NEGATIVE
ANTIBODY IDENTIFICATION: NORMAL
APPEARANCE UR: ABNORMAL
APTT PPP: 24.7 SECONDS (ref 23.2–33.7)
AST SERPL-CCNC: 11 UNIT/L (ref 5–34)
BACTERIA #/AREA URNS AUTO: ABNORMAL /HPF
BARBITURATE SCN PRESENT UR: NEGATIVE
BARBITURATE SCN PRESENT UR: NEGATIVE
BASOPHILS # BLD AUTO: 0.04 X10(3)/MCL (ref 0–0.2)
BASOPHILS NFR BLD AUTO: 0.2 %
BENZODIAZ UR QL SCN: NEGATIVE
BENZODIAZ UR QL SCN: NEGATIVE
BILIRUB UR QL STRIP.AUTO: NEGATIVE MG/DL
BILIRUBIN DIRECT+TOT PNL SERPL-MCNC: 0.3 MG/DL
BUN SERPL-MCNC: 4 MG/DL (ref 7–18.7)
CALCIUM SERPL-MCNC: 8.7 MG/DL (ref 8.4–10.2)
CANNABINOIDS UR QL SCN: NEGATIVE
CANNABINOIDS UR QL SCN: NEGATIVE
CHLORIDE SERPL-SCNC: 108 MMOL/L (ref 98–107)
CO2 SERPL-SCNC: 22 MMOL/L (ref 22–29)
COCAINE UR QL SCN: NEGATIVE
COCAINE UR QL SCN: NEGATIVE
COLOR UR AUTO: YELLOW
CREAT SERPL-MCNC: 0.65 MG/DL (ref 0.55–1.02)
EOSINOPHIL # BLD AUTO: 0.04 X10(3)/MCL (ref 0–0.9)
EOSINOPHIL NFR BLD AUTO: 0.2 %
ERYTHROCYTE [DISTWIDTH] IN BLOOD BY AUTOMATED COUNT: 14.1 % (ref 11.5–17)
FENTANYL UR QL SCN: NEGATIVE
FENTANYL UR QL SCN: POSITIVE
FIBRINOGEN PPP-MCNC: 494 MG/DL (ref 210–463)
GFR SERPLBLD CREATININE-BSD FMLA CKD-EPI: >60 MLS/MIN/1.73/M2
GLOBULIN SER-MCNC: 3.4 GM/DL (ref 2.4–3.5)
GLUCOSE SERPL-MCNC: 91 MG/DL (ref 74–100)
GLUCOSE UR QL STRIP.AUTO: NEGATIVE MG/DL
GROUP & RH: ABNORMAL
HCT VFR BLD AUTO: 40 % (ref 37–47)
HGB BLD-MCNC: 13.7 GM/DL (ref 12–16)
IMM GRANULOCYTES # BLD AUTO: 0.14 X10(3)/MCL (ref 0–0.04)
IMM GRANULOCYTES NFR BLD AUTO: 0.6 %
INDIRECT COOMBS GEL: ABNORMAL
INR BLD: 0.84 (ref 0–1.3)
KETONES UR QL STRIP.AUTO: NEGATIVE MG/DL
LEUKOCYTE ESTERASE UR QL STRIP.AUTO: ABNORMAL UNIT/L
LYMPHOCYTES # BLD AUTO: 1.33 X10(3)/MCL (ref 0.6–4.6)
LYMPHOCYTES NFR BLD AUTO: 6 %
MCH RBC QN AUTO: 34.4 PG (ref 27–31)
MCHC RBC AUTO-ENTMCNC: 34.3 MG/DL (ref 33–36)
MCV RBC AUTO: 100.5 FL (ref 80–94)
MDMA UR QL SCN: NEGATIVE
MDMA UR QL SCN: NEGATIVE
MONOCYTES # BLD AUTO: 1.02 X10(3)/MCL (ref 0.1–1.3)
MONOCYTES NFR BLD AUTO: 4.6 %
NEUTROPHILS # BLD AUTO: 19.6 X10(3)/MCL (ref 2.1–9.2)
NEUTROPHILS NFR BLD AUTO: 88.4 %
NITRITE UR QL STRIP.AUTO: NEGATIVE
NRBC BLD AUTO-RTO: 0 %
OPIATES UR QL SCN: NEGATIVE
OPIATES UR QL SCN: NEGATIVE
PCP UR QL: NEGATIVE
PCP UR QL: NEGATIVE
PH UR STRIP.AUTO: 7.5 [PH]
PH UR: 7.5 [PH] (ref 3–11)
PH UR: 7.5 [PH] (ref 3–11)
PLATELET # BLD AUTO: 359 X10(3)/MCL (ref 130–400)
PMV BLD AUTO: 10.5 FL (ref 7.4–10.4)
POTASSIUM SERPL-SCNC: 3.9 MMOL/L (ref 3.5–5.1)
PROT SERPL-MCNC: 5.8 GM/DL (ref 6.4–8.3)
PROT UR QL STRIP.AUTO: NEGATIVE MG/DL
PROTHROMBIN TIME: 11.5 SECONDS (ref 12.5–14.5)
RBC # BLD AUTO: 3.98 X10(6)/MCL (ref 4.2–5.4)
RBC #/AREA URNS AUTO: ABNORMAL /HPF
RBC UR QL AUTO: ABNORMAL UNIT/L
SODIUM SERPL-SCNC: 139 MMOL/L (ref 136–145)
SP GR UR STRIP.AUTO: 1.01 (ref 1–1.03)
SPECIFIC GRAVITY, URINE AUTO (.000) (OHS): 1.01 (ref 1–1.03)
SPECIFIC GRAVITY, URINE AUTO (.000) (OHS): 1.01 (ref 1–1.03)
SQUAMOUS #/AREA URNS AUTO: <5 /HPF
UROBILINOGEN UR STRIP-ACNC: 0.2 MG/DL
WBC # SPEC AUTO: 22.2 X10(3)/MCL (ref 4.5–11.5)
WBC #/AREA URNS AUTO: ABNORMAL /HPF

## 2022-08-28 PROCEDURE — 62326 NJX INTERLAMINAR LMBR/SAC: CPT | Performed by: STUDENT IN AN ORGANIZED HEALTH CARE EDUCATION/TRAINING PROGRAM

## 2022-08-28 PROCEDURE — 85025 COMPLETE CBC W/AUTO DIFF WBC: CPT | Performed by: OBSTETRICS & GYNECOLOGY

## 2022-08-28 PROCEDURE — 63600175 PHARM REV CODE 636 W HCPCS: Performed by: SPECIALIST

## 2022-08-28 PROCEDURE — 80307 DRUG TEST PRSMV CHEM ANLYZR: CPT | Performed by: SPECIALIST

## 2022-08-28 PROCEDURE — 63600175 PHARM REV CODE 636 W HCPCS

## 2022-08-28 PROCEDURE — 63600175 PHARM REV CODE 636 W HCPCS: Performed by: OBSTETRICS & GYNECOLOGY

## 2022-08-28 PROCEDURE — 86850 RBC ANTIBODY SCREEN: CPT | Performed by: OBSTETRICS & GYNECOLOGY

## 2022-08-28 PROCEDURE — 72100002 HC LABOR CARE, 1ST 8 HOURS

## 2022-08-28 PROCEDURE — 25000003 PHARM REV CODE 250: Performed by: STUDENT IN AN ORGANIZED HEALTH CARE EDUCATION/TRAINING PROGRAM

## 2022-08-28 PROCEDURE — 37000008 HC ANESTHESIA 1ST 15 MINUTES

## 2022-08-28 PROCEDURE — 85730 THROMBOPLASTIN TIME PARTIAL: CPT | Performed by: OBSTETRICS & GYNECOLOGY

## 2022-08-28 PROCEDURE — 80053 COMPREHEN METABOLIC PANEL: CPT | Performed by: OBSTETRICS & GYNECOLOGY

## 2022-08-28 PROCEDURE — 81001 URINALYSIS AUTO W/SCOPE: CPT | Performed by: OBSTETRICS & GYNECOLOGY

## 2022-08-28 PROCEDURE — 85610 PROTHROMBIN TIME: CPT | Performed by: OBSTETRICS & GYNECOLOGY

## 2022-08-28 PROCEDURE — 25000003 PHARM REV CODE 250: Performed by: SPECIALIST

## 2022-08-28 PROCEDURE — 51702 INSERT TEMP BLADDER CATH: CPT

## 2022-08-28 PROCEDURE — 72200005 HC VAGINAL DELIVERY LEVEL II

## 2022-08-28 PROCEDURE — 37000009 HC ANESTHESIA EA ADD 15 MINS

## 2022-08-28 PROCEDURE — 86870 RBC ANTIBODY IDENTIFICATION: CPT | Performed by: OBSTETRICS & GYNECOLOGY

## 2022-08-28 PROCEDURE — 72100003 HC LABOR CARE, EA. ADDL. 8 HRS

## 2022-08-28 PROCEDURE — 36415 COLL VENOUS BLD VENIPUNCTURE: CPT | Performed by: OBSTETRICS & GYNECOLOGY

## 2022-08-28 PROCEDURE — 11000001 HC ACUTE MED/SURG PRIVATE ROOM

## 2022-08-28 PROCEDURE — 27000207 HC ISOLATION

## 2022-08-28 PROCEDURE — 87088 URINE BACTERIA CULTURE: CPT | Performed by: OBSTETRICS & GYNECOLOGY

## 2022-08-28 PROCEDURE — 80307 DRUG TEST PRSMV CHEM ANLYZR: CPT | Performed by: OBSTETRICS & GYNECOLOGY

## 2022-08-28 PROCEDURE — 85384 FIBRINOGEN ACTIVITY: CPT | Performed by: OBSTETRICS & GYNECOLOGY

## 2022-08-28 RX ORDER — PROCHLORPERAZINE EDISYLATE 5 MG/ML
5 INJECTION INTRAMUSCULAR; INTRAVENOUS EVERY 6 HOURS PRN
Status: DISCONTINUED | OUTPATIENT
Start: 2022-08-28 | End: 2022-08-30 | Stop reason: HOSPADM

## 2022-08-28 RX ORDER — ACETAMINOPHEN 325 MG/1
650 TABLET ORAL EVERY 6 HOURS PRN
Status: DISCONTINUED | OUTPATIENT
Start: 2022-08-28 | End: 2022-08-30 | Stop reason: HOSPADM

## 2022-08-28 RX ORDER — CARBOPROST TROMETHAMINE 250 UG/ML
250 INJECTION, SOLUTION INTRAMUSCULAR
Status: DISCONTINUED | OUTPATIENT
Start: 2022-08-28 | End: 2022-08-30 | Stop reason: HOSPADM

## 2022-08-28 RX ORDER — LIDOCAINE HYDROCHLORIDE 10 MG/ML
10 INJECTION INFILTRATION; PERINEURAL ONCE AS NEEDED
Status: COMPLETED | OUTPATIENT
Start: 2022-08-28 | End: 2022-08-28

## 2022-08-28 RX ORDER — PRENATAL WITH FERROUS FUM AND FOLIC ACID 3080; 920; 120; 400; 22; 1.84; 3; 20; 10; 1; 12; 200; 27; 25; 2 [IU]/1; [IU]/1; MG/1; [IU]/1; MG/1; MG/1; MG/1; MG/1; MG/1; MG/1; UG/1; MG/1; MG/1; MG/1; MG/1
1 TABLET ORAL DAILY
Status: DISCONTINUED | OUTPATIENT
Start: 2022-08-29 | End: 2022-08-30 | Stop reason: HOSPADM

## 2022-08-28 RX ORDER — DIPHENHYDRAMINE HYDROCHLORIDE 50 MG/ML
12.5 INJECTION INTRAMUSCULAR; INTRAVENOUS EVERY 4 HOURS PRN
Status: DISCONTINUED | OUTPATIENT
Start: 2022-08-28 | End: 2022-08-28

## 2022-08-28 RX ORDER — IBUPROFEN 600 MG/1
600 TABLET ORAL EVERY 6 HOURS
Status: DISCONTINUED | OUTPATIENT
Start: 2022-08-28 | End: 2022-08-30 | Stop reason: HOSPADM

## 2022-08-28 RX ORDER — NALBUPHINE HYDROCHLORIDE 10 MG/ML
10 INJECTION, SOLUTION INTRAMUSCULAR; INTRAVENOUS; SUBCUTANEOUS
Status: DISCONTINUED | OUTPATIENT
Start: 2022-08-28 | End: 2022-08-30 | Stop reason: HOSPADM

## 2022-08-28 RX ORDER — HYDROCODONE BITARTRATE AND ACETAMINOPHEN 5; 325 MG/1; MG/1
1 TABLET ORAL EVERY 4 HOURS PRN
Status: DISCONTINUED | OUTPATIENT
Start: 2022-08-28 | End: 2022-08-30 | Stop reason: HOSPADM

## 2022-08-28 RX ORDER — SIMETHICONE 80 MG
1 TABLET,CHEWABLE ORAL EVERY 6 HOURS PRN
Status: DISCONTINUED | OUTPATIENT
Start: 2022-08-28 | End: 2022-08-30 | Stop reason: HOSPADM

## 2022-08-28 RX ORDER — MISOPROSTOL 100 UG/1
800 TABLET ORAL
Status: DISCONTINUED | OUTPATIENT
Start: 2022-08-28 | End: 2022-08-30 | Stop reason: HOSPADM

## 2022-08-28 RX ORDER — SIMETHICONE 80 MG
1 TABLET,CHEWABLE ORAL 4 TIMES DAILY PRN
Status: DISCONTINUED | OUTPATIENT
Start: 2022-08-28 | End: 2022-08-28

## 2022-08-28 RX ORDER — DIPHENHYDRAMINE HYDROCHLORIDE 50 MG/ML
25 INJECTION INTRAMUSCULAR; INTRAVENOUS EVERY 6 HOURS PRN
Status: DISCONTINUED | OUTPATIENT
Start: 2022-08-28 | End: 2022-08-30 | Stop reason: HOSPADM

## 2022-08-28 RX ORDER — BUPIVACAINE HYDROCHLORIDE 2.5 MG/ML
INJECTION, SOLUTION INFILTRATION; PERINEURAL
Status: DISCONTINUED | OUTPATIENT
Start: 2022-08-28 | End: 2022-08-28

## 2022-08-28 RX ORDER — METHYLERGONOVINE MALEATE 0.2 MG/ML
200 INJECTION INTRAVENOUS
Status: DISCONTINUED | OUTPATIENT
Start: 2022-08-28 | End: 2022-08-30 | Stop reason: HOSPADM

## 2022-08-28 RX ORDER — HYDROCORTISONE 25 MG/G
CREAM TOPICAL 3 TIMES DAILY PRN
Status: DISCONTINUED | OUTPATIENT
Start: 2022-08-28 | End: 2022-08-30 | Stop reason: HOSPADM

## 2022-08-28 RX ORDER — SODIUM CHLORIDE 0.9 % (FLUSH) 0.9 %
10 SYRINGE (ML) INJECTION
Status: DISCONTINUED | OUTPATIENT
Start: 2022-08-28 | End: 2022-08-30 | Stop reason: HOSPADM

## 2022-08-28 RX ORDER — DIPHENOXYLATE HYDROCHLORIDE AND ATROPINE SULFATE 2.5; .025 MG/1; MG/1
1 TABLET ORAL 4 TIMES DAILY PRN
Status: DISCONTINUED | OUTPATIENT
Start: 2022-08-28 | End: 2022-08-28

## 2022-08-28 RX ORDER — FENTANYL/BUPIVACAINE/NS/PF 2-1250MCG
PLASTIC BAG, INJECTION (ML) INJECTION CONTINUOUS
Status: DISCONTINUED | OUTPATIENT
Start: 2022-08-28 | End: 2022-08-28

## 2022-08-28 RX ORDER — OXYTOCIN/RINGER'S LACTATE 30/500 ML
95 PLASTIC BAG, INJECTION (ML) INTRAVENOUS ONCE
Status: COMPLETED | OUTPATIENT
Start: 2022-08-28 | End: 2022-08-28

## 2022-08-28 RX ORDER — SODIUM CITRATE AND CITRIC ACID MONOHYDRATE 334; 500 MG/5ML; MG/5ML
30 SOLUTION ORAL ONCE
Status: COMPLETED | OUTPATIENT
Start: 2022-08-28 | End: 2022-08-28

## 2022-08-28 RX ORDER — SODIUM CHLORIDE, SODIUM LACTATE, POTASSIUM CHLORIDE, CALCIUM CHLORIDE 600; 310; 30; 20 MG/100ML; MG/100ML; MG/100ML; MG/100ML
INJECTION, SOLUTION INTRAVENOUS CONTINUOUS
Status: DISCONTINUED | OUTPATIENT
Start: 2022-08-28 | End: 2022-08-28

## 2022-08-28 RX ORDER — FENTANYL CITRATE 50 UG/ML
INJECTION, SOLUTION INTRAMUSCULAR; INTRAVENOUS
Status: DISCONTINUED | OUTPATIENT
Start: 2022-08-28 | End: 2022-08-28

## 2022-08-28 RX ORDER — OXYTOCIN/RINGER'S LACTATE 30/500 ML
0-30 PLASTIC BAG, INJECTION (ML) INTRAVENOUS CONTINUOUS
Status: DISCONTINUED | OUTPATIENT
Start: 2022-08-28 | End: 2022-08-28

## 2022-08-28 RX ORDER — LIDOCAINE HYDROCHLORIDE AND EPINEPHRINE 15; 5 MG/ML; UG/ML
INJECTION, SOLUTION EPIDURAL
Status: DISCONTINUED | OUTPATIENT
Start: 2022-08-28 | End: 2022-08-28

## 2022-08-28 RX ORDER — CALCIUM CARBONATE 200(500)MG
500 TABLET,CHEWABLE ORAL 3 TIMES DAILY PRN
Status: DISCONTINUED | OUTPATIENT
Start: 2022-08-28 | End: 2022-08-30 | Stop reason: HOSPADM

## 2022-08-28 RX ORDER — PROMETHAZINE HYDROCHLORIDE 25 MG/ML
25 INJECTION, SOLUTION INTRAMUSCULAR; INTRAVENOUS EVERY 6 HOURS PRN
Status: DISCONTINUED | OUTPATIENT
Start: 2022-08-28 | End: 2022-08-28

## 2022-08-28 RX ORDER — OXYTOCIN/RINGER'S LACTATE 30/500 ML
95 PLASTIC BAG, INJECTION (ML) INTRAVENOUS ONCE
Status: DISCONTINUED | OUTPATIENT
Start: 2022-08-28 | End: 2022-08-30 | Stop reason: HOSPADM

## 2022-08-28 RX ORDER — FAMOTIDINE 10 MG/ML
20 INJECTION INTRAVENOUS ONCE
Status: DISCONTINUED | OUTPATIENT
Start: 2022-08-28 | End: 2022-08-28

## 2022-08-28 RX ORDER — MUPIROCIN 20 MG/G
OINTMENT TOPICAL
Status: CANCELLED | OUTPATIENT
Start: 2022-08-28

## 2022-08-28 RX ORDER — ONDANSETRON 4 MG/1
8 TABLET, ORALLY DISINTEGRATING ORAL EVERY 8 HOURS PRN
Status: DISCONTINUED | OUTPATIENT
Start: 2022-08-28 | End: 2022-08-30 | Stop reason: HOSPADM

## 2022-08-28 RX ORDER — ONDANSETRON 2 MG/ML
4 INJECTION INTRAMUSCULAR; INTRAVENOUS EVERY 6 HOURS PRN
Status: DISCONTINUED | OUTPATIENT
Start: 2022-08-28 | End: 2022-08-30 | Stop reason: HOSPADM

## 2022-08-28 RX ORDER — DOCUSATE SODIUM 100 MG/1
200 CAPSULE, LIQUID FILLED ORAL 2 TIMES DAILY PRN
Status: DISCONTINUED | OUTPATIENT
Start: 2022-08-28 | End: 2022-08-30 | Stop reason: HOSPADM

## 2022-08-28 RX ORDER — DIPHENHYDRAMINE HYDROCHLORIDE 50 MG/ML
25 INJECTION INTRAMUSCULAR; INTRAVENOUS EVERY 4 HOURS PRN
Status: DISCONTINUED | OUTPATIENT
Start: 2022-08-28 | End: 2022-08-30 | Stop reason: HOSPADM

## 2022-08-28 RX ORDER — DIPHENHYDRAMINE HCL 25 MG
25 CAPSULE ORAL EVERY 4 HOURS PRN
Status: DISCONTINUED | OUTPATIENT
Start: 2022-08-28 | End: 2022-08-30 | Stop reason: HOSPADM

## 2022-08-28 RX ORDER — OXYTOCIN/RINGER'S LACTATE 30/500 ML
334 PLASTIC BAG, INJECTION (ML) INTRAVENOUS ONCE
Status: DISCONTINUED | OUTPATIENT
Start: 2022-08-28 | End: 2022-08-30 | Stop reason: HOSPADM

## 2022-08-28 RX ADMIN — BUPIVACAINE HYDROCHLORIDE 5 ML: 2.5 INJECTION, SOLUTION INFILTRATION; PERINEURAL at 09:08

## 2022-08-28 RX ADMIN — DIPHENHYDRAMINE HYDROCHLORIDE 25 MG: 50 INJECTION INTRAMUSCULAR; INTRAVENOUS at 02:08

## 2022-08-28 RX ADMIN — LIDOCAINE HYDROCHLORIDE,EPINEPHRINE BITARTRATE 2 ML: 15; .005 INJECTION, SOLUTION EPIDURAL; INFILTRATION; INTRACAUDAL; PERINEURAL at 09:08

## 2022-08-28 RX ADMIN — FENTANYL CITRATE 100 MCG: 50 INJECTION, SOLUTION INTRAMUSCULAR; INTRAVENOUS at 01:08

## 2022-08-28 RX ADMIN — Medication 2 MILLI-UNITS/MIN: at 09:08

## 2022-08-28 RX ADMIN — HYDROCODONE BITARTRATE AND ACETAMINOPHEN 1 TABLET: 5; 325 TABLET ORAL at 07:08

## 2022-08-28 RX ADMIN — LIDOCAINE HYDROCHLORIDE 1 ML: 10 INJECTION, SOLUTION EPIDURAL; INFILTRATION; INTRACAUDAL; PERINEURAL at 04:08

## 2022-08-28 RX ADMIN — AMPICILLIN SODIUM 1 G: 1 INJECTION, POWDER, FOR SOLUTION INTRAMUSCULAR; INTRAVENOUS at 03:08

## 2022-08-28 RX ADMIN — PROMETHAZINE HYDROCHLORIDE 25 MG: 25 INJECTION INTRAMUSCULAR; INTRAVENOUS at 02:08

## 2022-08-28 RX ADMIN — LIDOCAINE HYDROCHLORIDE,EPINEPHRINE BITARTRATE 3 ML: 15; .005 INJECTION, SOLUTION EPIDURAL; INFILTRATION; INTRACAUDAL; PERINEURAL at 09:08

## 2022-08-28 RX ADMIN — SODIUM CHLORIDE, POTASSIUM CHLORIDE, SODIUM LACTATE AND CALCIUM CHLORIDE: 600; 310; 30; 20 INJECTION, SOLUTION INTRAVENOUS at 09:08

## 2022-08-28 RX ADMIN — SODIUM CITRATE AND CITRIC ACID MONOHYDRATE 30 ML: 500; 334 SOLUTION ORAL at 09:08

## 2022-08-28 RX ADMIN — DIPHENHYDRAMINE HYDROCHLORIDE 25 MG: 25 CAPSULE ORAL at 11:08

## 2022-08-28 RX ADMIN — AMPICILLIN SODIUM 2 G: 2 INJECTION, POWDER, FOR SOLUTION INTRAMUSCULAR; INTRAVENOUS at 09:08

## 2022-08-28 RX ADMIN — NALBUPHINE HYDROCHLORIDE 10 MG: 10 INJECTION, SOLUTION INTRAMUSCULAR; INTRAVENOUS; SUBCUTANEOUS at 01:08

## 2022-08-28 RX ADMIN — BUTORPHANOL TARTRATE 2 MG: 2 INJECTION, SOLUTION INTRAMUSCULAR; INTRAVENOUS at 04:08

## 2022-08-28 RX ADMIN — Medication 12 ML/HR: at 09:08

## 2022-08-28 RX ADMIN — HYDROCODONE BITARTRATE AND ACETAMINOPHEN 1 TABLET: 5; 325 TABLET ORAL at 11:08

## 2022-08-28 RX ADMIN — IBUPROFEN 600 MG: 600 TABLET ORAL at 09:08

## 2022-08-28 RX ADMIN — Medication 95 MILLI-UNITS/MIN: at 05:08

## 2022-08-28 NOTE — ANESTHESIA PREPROCEDURE EVALUATION
08/28/2022  Nancy Ramos is a 31 y.o., female.      Pre-op Assessment    I have reviewed the Patient Summary Reports.     I have reviewed the Nursing Notes.    I have reviewed the Medications.     Review of Systems  Anesthesia Hx:   Denies Personal Hx of Anesthesia complications.   Social:  Smoker Polysubstance abuse   Psych:   anxiety depression          Physical Exam  General: Cooperative and Alert    Airway:  Mallampati: II   Mouth Opening: Normal  Tongue: Normal    Dental:  Periodontal disease  Poor dentition      Anesthesia Plan  Type of Anesthesia, risks & benefits discussed:    Anesthesia Type: Epidural  Intra-op Monitoring Plan: Standard ASA Monitors  Informed Consent: Informed consent signed with the Patient and all parties understand the risks and agree with anesthesia plan.  All questions answered.   ASA Score: 3  Day of Surgery Review of History & Physical: H&P Update referred to the surgeon/provider.  Anesthesia Plan Notes:   ASA 3 for polysubstance abuse, poor prenatal care, hx of incarceration, oligo/fgr, tobacco      Ready For Surgery From Anesthesia Perspective.     .

## 2022-08-28 NOTE — L&D DELIVERY NOTE
[facility] - Antepartum  Vaginal Delivery   Operative Note    SUMMARY   Patient began having abdominal pain and uterine contractions early this morning.  Her CBC showed an elevated white count which may have been consistent with developing chorioamnionitis.  The patient was transferred to Labor and delivery environment where she progressed with Pitocin augmentation to vaginal delivery.  Normal spontaneous vaginal delivery of live infant, skin to skin was unable to be performed due to evaluation and care of the infant by NICU team. .  Infant delivered position OA over intact perineum.  Nuchal cord: No.    Spontaneous delivery of placenta and IV pitocin given noting good uterine tone.  Right and left vaginal sidewall lacerations were noted and repaired with local anesthesia with 3-0 chromic in interrupted fashion.  Of the fetal .  Patient tolerated delivery well. Sponge needle and lap counted correctly x2.    Indications:  premature rupture of membranes (PPROM) with unknown onset of labor  Pregnancy complicated by:   Patient Active Problem List   Diagnosis    Depressive disorder    Tobacco user    Generalized anxiety disorder    Syphilis affecting pregnancy in third trimester    Heartburn during pregnancy in third trimester    Intrauterine growth restriction affecting antepartum care of mother in third trimester    Drug abuse during pregnancy    Oligohydramnios antepartum, third trimester, not applicable or unspecified fetus    Pregnancy with 32 completed weeks gestation     premature rupture of membranes (PPROM) with unknown onset of labor    Pregnancy affected by fetal growth restriction    Substance abuse affecting pregnancy in third trimester, antepartum    Tobacco smoking affecting pregnancy in third trimester    Positive GBS test    Rubella non-immune status, antepartum     Admitting GA: 33w1d    Delivery Information for Minerva Ramos    Birth information:  Date of birth:  "2022   Time of birth: 3:53 PM   Sex: female   Head Delivery Date/Time: 2022  3:53 PM   Delivery type: Vaginal, Spontaneous   Gestational Age: 33w1d    Delivery Providers    Delivering clinician: Robinson Nichols MD   Provider Role    Rachelle Guerra RN Registered Nurse    Denia Rodriguez, RN Registered Nurse    Lola Lopez RN Registered Nurse    Asmita Chou, RN Registered Nurse              Measurements    Weight: 1890 g  Weight (lbs): 4 lb 2.7 oz  Length: 47 cm  Length (in): 18.5"  Head circumference: 28 cm  Chest circumference: 25.5 cm  Abdominal girth: 22.5 cm         Apgars    Living status: Living  Apgars:  1 min.:  5 min.:  10 min.:  15 min.:  20 min.:    Skin color:  0  1       Heart rate:  2  2       Reflex irritability:  2  2       Muscle tone:  2  2       Respiratory effort:  2  2       Total:  8  9       Apgars assigned by: SHAHZAD ORTEGA RN         Operative Delivery    Forceps attempted?: No  Vacuum extractor attempted?: No         Shoulder Dystocia    Shoulder dystocia present?: No           Presentation    Presentation: Vertex  Position: Occiput Anterior           Interventions/Resuscitation    Method: Bulb Suctioning, Tactile Stimulation, NICU Attended       Cord    Vessels: 3 vessels  Complications: None  Delayed Cord Clamping?: No  Cord Blood Disposition: Sent with Baby  Gases Sent?: No  Stem Cell Collection (by MD): No       Placenta    Placenta delivery date/time: 2022 1558  Placenta removal: Spontaneous  Placenta appearance: Intact  Placenta disposition: pathology           Labor Events:       labor: Yes     Labor Onset Date/Time: 2022 07:35     Dilation Complete Date/Time: 2022 15:45     Start Pushing Date/Time: 2022 15:42     Rupture Date/Time:   930         Rupture type: SRM (Spontaneous Rupture)           Fluid Amount:         Fluid Color: Clear         steroids: Full Course     Antibiotics given for GBS: Yes     Induction: oxytocin     Indications " for induction:        Augmentation:       Indications for augmentation:       Labor complications: None     Additional complications:          Cervical ripening:                     Delivery:      Episiotomy: None     Indication for Episiotomy:       Perineal Lacerations: None Repaired:      Periurethral Laceration:   Repaired:     Labial Laceration:   Repaired:     Sulcus Laceration:   Repaired:     Vaginal Laceration: Yes Repaired: Yes   Cervical Laceration:   Repaired:     Repair suture:       Repair # of packets: 1     Last Value - EBL - Nursing (mL):       Sum - EBL - Nursing (mL): 0     Last Value - EBL - Anesthesia (mL):        Calculated QBL (mL):         Vaginal Sweep Performed: Yes     Surgicount Correct: Yes       Other providers:       Anesthesia    Method: Spinal, Local          Details (if applicable):  Trial of Labor      Categorization:      Priority:     Indications for :     Incision Type:       Additional  information:  Forceps:    Vacuum:    Breech:    Observed anomalies    Other (Comments):

## 2022-08-28 NOTE — ANESTHESIA PROCEDURE NOTES
Epidural    Patient location during procedure: OB   Reason for block: primary anesthetic   Reason for block: labor analgesia requested by patient and obstetrician  Diagnosis: IUP   Start time: 8/28/2022 9:24 AM  Timeout: 8/28/2022 9:20 AM  End time: 8/28/2022 9:30 AM    Staffing  Performing Provider: Zachary Kamara MD  Authorizing Provider: Zachary Kamara MD        Preanesthetic Checklist  Completed: patient identified, IV checked, site marked, risks and benefits discussed, surgical consent, monitors and equipment checked, pre-op evaluation, timeout performed, anesthesia consent given, hand hygiene performed and patient being monitored  Preparation  Patient position: sitting  Prep: ChloraPrep  Patient monitoring: Blood Pressure and Pulse Ox  Reason for block: primary anesthetic   Epidural  Skin Anesthetic: lidocaine 1%  Administration type: continuous  Approach: midline  Interspace: L3-4    Injection technique: OSMANY saline  Needle and Epidural Catheter  Needle type: Tuohy   Needle insertion depth: 6 cm  Catheter at skin depth: 11 cm  Insertion Attempts: 1  Additional Documentation: incremental injection, negative aspiration for heme and CSF, no paresthesia on injection, no signs/symptoms of IV or SA injection and no significant complaints from patient  Needle localization: anatomical landmarks  Assessment  Ease of block: easy  Patient's tolerance of the procedure: comfortable throughout block  Additional Notes  Straightforward epidural, crisp OSMANY, + CSF with 25G Arpan as part of DPE technique, easy threading of catheter, neg SA/IV test dose    Anh ZELAYA  No inadvertent dural puncture with Tuohy.  Dural puncture performed with spinal needle.

## 2022-08-28 NOTE — PROGRESS NOTES
OB Hospitalist Note  Ante PPROM Progress      Overnight, she has been acting acutely agitated and complaining of ctx. Her cervix has remained unchanged when checked by RN. Of note, patient did have two men visitors and I suspect that she is receiving/taking illicit drugs. Patient denies fevers or chills says she is feeling pressure in pelvis and on abdomen. Denies vaginal bleeding. She received stadol then nubain. Then benadryl/phenergan and did not relieve her discomforts/agitation.   Denies HA, blurry vision/scotoma, or RUQ/epigastric pains  Temp:  [97.3 °F (36.3 °C)-98.9 °F (37.2 °C)] 98.1 °F (36.7 °C)  Pulse:  [58-93] 78  Resp:  [18] 18  SpO2:  [100 %] 100 %  BP: (125-135)/(73-82) 129/78    GEN NAD  PULM unlabored  ABDOMEN gravid, palpates SOFT in between ctx, mildly tender to palpation  BSUS confirms cephalic presentation  EXT no edema, nontender    SVE (PeriWATCH)  Dilation (cm): 1  Effacement (%): 100  Station: -2  Examined by:: carly ellsworth rn  Simplified Virgen Score: 5     EFM: Cat 1, 130 modBTV, +accel, no decel (reassuring, reactive)  TOCO: ctx q5-10 mins when able to be monitored         33w1d PPROM, hx incarceration/polysubstance use, oligo/FGR, Rh neg, tobacco smoker, GBS POSITIVE and RNI  She is now completed 4th day of latency abx  Also getting weekly bicillin (Thurs) for suspected reinfection of syphillis  COVID neg, TB quant gold test pending    Fetal status is reassuring  High suspicion for illicit drug use while hospitalized (with visitors coming over night), discussed need for evaluating her urine with thoughts for infection, pt agrees to give urine sample  UA/Ucx reflex/UDS  CBC/T&S/CMP/coags  Stadol IV now  Pending these diagnostic results, we will likely proceed with IOL/epidural for pain management for suspected labor/chorio, monitor closely fetal status  Needs GBS ppx and if suspect chorio (with leukocytosis) appropriate abx. Shift change this am is about to occur and will relay all of  this to oncoming team    Marilyn Boone MD  OB/GYN Hospitalist  6:32 AM 08/28/2022

## 2022-08-29 LAB
ABS NEUT (OLG): 24.84 X10(3)/MCL (ref 2.1–9.2)
ALBUMIN SERPL-MCNC: 2.3 GM/DL (ref 3.5–5)
ALBUMIN/GLOB SERPL: 0.7 RATIO (ref 1.1–2)
ALP SERPL-CCNC: 184 UNIT/L (ref 40–150)
ALT SERPL-CCNC: 14 UNIT/L (ref 0–55)
AST SERPL-CCNC: 16 UNIT/L (ref 5–34)
BASOPHILS # BLD AUTO: 0.07 X10(3)/MCL (ref 0–0.2)
BASOPHILS NFR BLD AUTO: 0.3 %
BILIRUBIN DIRECT+TOT PNL SERPL-MCNC: 0.3 MG/DL
BUN SERPL-MCNC: 5.1 MG/DL (ref 7–18.7)
CALCIUM SERPL-MCNC: 8.8 MG/DL (ref 8.4–10.2)
CHLORIDE SERPL-SCNC: 104 MMOL/L (ref 98–107)
CO2 SERPL-SCNC: 26 MMOL/L (ref 22–29)
CREAT SERPL-MCNC: 0.7 MG/DL (ref 0.55–1.02)
CREAT UR-MCNC: 94.4 MG/DL (ref 47–110)
EOSINOPHIL # BLD AUTO: 0.06 X10(3)/MCL (ref 0–0.9)
EOSINOPHIL NFR BLD AUTO: 0.2 %
EOSINOPHIL NFR BLD MANUAL: 0.27 X10(3)/MCL (ref 0–0.9)
EOSINOPHIL NFR BLD MANUAL: 1 %
ERYTHROCYTE [DISTWIDTH] IN BLOOD BY AUTOMATED COUNT: 14 % (ref 11.5–17)
ERYTHROCYTE [DISTWIDTH] IN BLOOD BY AUTOMATED COUNT: 14.1 % (ref 11.5–17)
GAMMA INTERFERON BACKGROUND BLD IA-ACNC: 0.01 IU/ML
GFR SERPLBLD CREATININE-BSD FMLA CKD-EPI: >60 MLS/MIN/1.73/M2
GLOBULIN SER-MCNC: 3.3 GM/DL (ref 2.4–3.5)
GLUCOSE SERPL-MCNC: 78 MG/DL (ref 74–100)
HCT VFR BLD AUTO: 37.7 % (ref 37–47)
HCT VFR BLD AUTO: 39.2 % (ref 37–47)
HGB BLD-MCNC: 12.7 GM/DL (ref 12–16)
HGB BLD-MCNC: 12.9 GM/DL (ref 12–16)
IMM GRANULOCYTES # BLD AUTO: 0.19 X10(3)/MCL (ref 0–0.04)
IMM GRANULOCYTES # BLD AUTO: 0.2 X10(3)/MCL (ref 0–0.04)
IMM GRANULOCYTES NFR BLD AUTO: 0.7 %
IMM GRANULOCYTES NFR BLD AUTO: 0.8 %
INSTRUMENT WBC (OLG): 27 X10(3)/MCL
LYMPHOCYTES # BLD AUTO: 2.67 X10(3)/MCL (ref 0.6–4.6)
LYMPHOCYTES NFR BLD AUTO: 10.6 %
LYMPHOCYTES NFR BLD MANUAL: 1.08 X10(3)/MCL
LYMPHOCYTES NFR BLD MANUAL: 4 %
M TB IFN-G BLD-IMP: NEGATIVE
M TB IFN-G CD4+ BCKGRND COR BLD-ACNC: 0 IU/ML
M TB IFN-G CD4+CD8+ BCKGRND COR BLD-ACNC: 0 IU/ML
MCH RBC QN AUTO: 33.7 PG (ref 27–31)
MCH RBC QN AUTO: 34.2 PG (ref 27–31)
MCHC RBC AUTO-ENTMCNC: 32.9 MG/DL (ref 33–36)
MCHC RBC AUTO-ENTMCNC: 33.7 MG/DL (ref 33–36)
MCV RBC AUTO: 101.6 FL (ref 80–94)
MCV RBC AUTO: 102.3 FL (ref 80–94)
MITOGEN IGNF BCKGRD COR BLD-ACNC: 7.39 IU/ML
MONOCYTES # BLD AUTO: 0.79 X10(3)/MCL (ref 0.1–1.3)
MONOCYTES NFR BLD AUTO: 3.1 %
MONOCYTES NFR BLD MANUAL: 0.81 X10(3)/MCL (ref 0.1–1.3)
MONOCYTES NFR BLD MANUAL: 3 %
NEUTROPHILS # BLD AUTO: 21.3 X10(3)/MCL (ref 2.1–9.2)
NEUTROPHILS NFR BLD AUTO: 85 %
NEUTROPHILS NFR BLD MANUAL: 92 %
NRBC BLD AUTO-RTO: 0 %
NRBC BLD AUTO-RTO: 0 %
PLATELET # BLD AUTO: 328 X10(3)/MCL (ref 130–400)
PLATELET # BLD AUTO: 357 X10(3)/MCL (ref 130–400)
PLATELET # BLD EST: NORMAL 10*3/UL
PMV BLD AUTO: 11 FL (ref 7.4–10.4)
PMV BLD AUTO: 11 FL (ref 7.4–10.4)
POTASSIUM SERPL-SCNC: 3.7 MMOL/L (ref 3.5–5.1)
PROT SERPL-MCNC: 5.6 GM/DL (ref 6.4–8.3)
PROT UR STRIP-MCNC: 20.1 MG/DL
PROT/CREAT UR-RTO: 212.9 MG/GM CR
RBC # BLD AUTO: 3.71 X10(6)/MCL (ref 4.2–5.4)
RBC # BLD AUTO: 3.83 X10(6)/MCL (ref 4.2–5.4)
RBC MORPH BLD: NORMAL
ROSETTE - FMH (FETAL BLEED SCREEN): NORMAL
SODIUM SERPL-SCNC: 138 MMOL/L (ref 136–145)
WBC # SPEC AUTO: 25.1 X10(3)/MCL (ref 4.5–11.5)
WBC # SPEC AUTO: 26.9 X10(3)/MCL (ref 4.5–11.5)

## 2022-08-29 PROCEDURE — 85025 COMPLETE CBC W/AUTO DIFF WBC: CPT | Performed by: SPECIALIST

## 2022-08-29 PROCEDURE — 85025 COMPLETE CBC W/AUTO DIFF WBC: CPT | Performed by: STUDENT IN AN ORGANIZED HEALTH CARE EDUCATION/TRAINING PROGRAM

## 2022-08-29 PROCEDURE — 36415 COLL VENOUS BLD VENIPUNCTURE: CPT | Performed by: SPECIALIST

## 2022-08-29 PROCEDURE — 80053 COMPREHEN METABOLIC PANEL: CPT | Performed by: STUDENT IN AN ORGANIZED HEALTH CARE EDUCATION/TRAINING PROGRAM

## 2022-08-29 PROCEDURE — 25000003 PHARM REV CODE 250: Performed by: SPECIALIST

## 2022-08-29 PROCEDURE — 36415 COLL VENOUS BLD VENIPUNCTURE: CPT | Performed by: STUDENT IN AN ORGANIZED HEALTH CARE EDUCATION/TRAINING PROGRAM

## 2022-08-29 PROCEDURE — 11000001 HC ACUTE MED/SURG PRIVATE ROOM

## 2022-08-29 PROCEDURE — 85461 HEMOGLOBIN FETAL: CPT | Performed by: OBSTETRICS & GYNECOLOGY

## 2022-08-29 PROCEDURE — 82570 ASSAY OF URINE CREATININE: CPT | Performed by: STUDENT IN AN ORGANIZED HEALTH CARE EDUCATION/TRAINING PROGRAM

## 2022-08-29 PROCEDURE — 27000207 HC ISOLATION

## 2022-08-29 RX ADMIN — HYDROCODONE BITARTRATE AND ACETAMINOPHEN 1 TABLET: 5; 325 TABLET ORAL at 03:08

## 2022-08-29 RX ADMIN — HYDROCODONE BITARTRATE AND ACETAMINOPHEN 1 TABLET: 5; 325 TABLET ORAL at 04:08

## 2022-08-29 RX ADMIN — IBUPROFEN 600 MG: 600 TABLET ORAL at 06:08

## 2022-08-29 RX ADMIN — HYDROCODONE BITARTRATE AND ACETAMINOPHEN 1 TABLET: 5; 325 TABLET ORAL at 11:08

## 2022-08-29 RX ADMIN — IBUPROFEN 600 MG: 600 TABLET ORAL at 02:08

## 2022-08-29 RX ADMIN — DIPHENHYDRAMINE HYDROCHLORIDE 25 MG: 25 CAPSULE ORAL at 08:08

## 2022-08-29 NOTE — PROGRESS NOTES
Post-Partum Progress Note        Subjective:      Post-Op Day #1 after      Patient currently doing well without complaints. Pain is well controlled.  She is ambulating, tolerating regular diet, and passing flatus. No BM. States she has scant vaginal discharge.  Overnight, blood pressure 140's up to 168 systolic, diastolic up to 97. She denies headache, vision changes, RUQ pain.     Objective:      Temp:  [97.6 °F (36.4 °C)-98.8 °F (37.1 °C)] 98.8 °F (37.1 °C)  Pulse:  [] 88  Resp:  [16-18] 17  SpO2:  [90 %-100 %] 90 %  BP: (121-168)/() 128/86    Intake/Output Summary (Last 24 hours) at 2022 1001  Last data filed at 2022 1628  Gross per 24 hour   Intake --   Output 1150 ml   Net -1150 ml     There is no height or weight on file to calculate BMI.    General: no acute distress  Cardio: regular rate and rhythm  Resp: unlabored work of breathing  Abdomen: soft, non-tender, non-distended. Fundus palpable below umbilicus.    Extremities: non-tender, symmetric, no edema    Recent Results (from the past 336 hour(s))   CBC with Differential    Collection Time: 22  5:39 AM   Result Value Ref Range    WBC 26.9 (H) 4.5 - 11.5 x10(3)/mcL    Hgb 12.7 12.0 - 16.0 gm/dL    Hct 37.7 37.0 - 47.0 %    Platelet 328 130 - 400 x10(3)/mcL   CBC with Differential    Collection Time: 22  6:51 AM   Result Value Ref Range    WBC 22.2 (H) 4.5 - 11.5 x10(3)/mcL    Hgb 13.7 12.0 - 16.0 gm/dL    Hct 40.0 37.0 - 47.0 %    Platelet 359 130 - 400 x10(3)/mcL   CBC with Differential    Collection Time: 22  6:29 AM   Result Value Ref Range    WBC 10.7 4.5 - 11.5 x10(3)/mcL    Hgb 12.7 12.0 - 16.0 gm/dL    Hct 37.4 37.0 - 47.0 %    Platelet 358 130 - 400 x10(3)/mcL       Assessment:     31 y.o. female post-partum day 1 after - Doing Well      Plan:     1. Continue routine post-operative care  2. Monitor blood pressure  3. Urine Protein/Creatinine ratio  4. Monitor urine output    Nayeli Stuart MD  [facility]  Family Medicine HO-1

## 2022-08-29 NOTE — ANESTHESIA POSTPROCEDURE EVALUATION
Anesthesia Post Evaluation    Patient: Nancy Ramos    Procedure(s) Performed: * No procedures listed *    Final Anesthesia Type: epidural      Patient location during evaluation: PACU  Patient participation: Yes- Able to Participate  Level of consciousness: awake and alert  Post-procedure vital signs: reviewed and stable  Pain management: adequate  Airway patency: patent  CATRACHITO mitigation strategies: Use of major conduction anesthesia (spinal/epidural) or peripheral nerve block  PONV status at discharge: No PONV  Anesthetic complications: no      Cardiovascular status: hemodynamically stable  Respiratory status: unassisted, spontaneous ventilation and room air  Hydration status: euvolemic  Follow-up not needed.  Comments: Denies problem c spinal or epidural anesthesia           Vitals Value Taken Time   /86 08/29/22 0850   Temp 37.1 °C (98.8 °F) 08/29/22 0850   Pulse 88 08/29/22 0850   Resp 18 08/29/22 1136   SpO2 90 % 08/28/22 1550         No case tracking events are documented in the log.      Pain/Yogi Score: Pain Rating Prior to Med Admin: 5 (8/29/2022 11:36 AM)

## 2022-08-29 NOTE — CONSULTS
"Copied from baby's chart:    .. Admit Assessment    Patient Identification  Girl Nancy Ramos   :  2022  Admit Date:  2022  Attending Provider:  Darling Cruz MD              Referral:   Pt was admitted to NICU with a diagnosis of [place] exposure to maternal syphilis, and was admitted this hospital stay due to Prematurity, 1,750-1,999 grams, 33-34 completed weeks [P07.17].   is involved and was referred to the Social Work Department via Routine NICU consult.    I spoke with mom via phone due to isolation precautions at this time; mom was in her post-partum room. Mom verbally presented appropriate and was cooperative. Baby Dino hilario was born via Vag. Delivery at 33.1 WGA weighing 4 lbs 2.7 oz.. Verified her face sheet information:      Living Situation:      Resides at [address] [place] [address], phone: [phone] (home).  Cell # [phone]     Assessment narrative here: Mom has identified FOB, Nawaf Clark and reported he will sign Birth certificate and currently involved. Mom reported to having all necessary supplies for baby; including a car seat and safe sleep. We thoroughly dicussed safe sleep and car seat safety and I have included literature in mom's NICU packet. Mom reported this is her first child. Mom denied a hx of employment and reported her plans to "look for work in the next 6 weeks." Mom reported her plans to formula feed and I provided mom with WIC/SNAPs resources in her NICU packet. I plan to continue to follow-up to provide support and resources as needed. Mom denied having any additional questions or concerns at this time. We discussed mandating policy and mom verbally expressed her understanding.  OB: Dr. Heaven RIOS: None selected at this time. Provided mom with a reference list.      History/Current Symptoms of Anxiety/Depression: Mom reported a hx of depression and anxiety. Mom denied medication tx " of therapy tx at this time.   Discussed PPD and identifying symptoms and provided mom with PPD counseling resources and symptom brochure.       Identified Support:  Friend: Myra ([phone])     History/Current Substance Use: Mom reported to a hx of Meth./amph. and THC. Mom reported her last use was a week ago. I provided mom with inpatient and outpatient resources.   Mom's UDS upon admit: + for Amphetamine.  Hx UDS: hx of pos. UDS screen for Benzo, Opiate, Amphetamine, Fentanyl, Cannabinoids    DCFS: Pending Baby MDS; UDS negative     Indications of Abuse/Neglect:  Denied         Emotional/Behavioral/Cognitive Issues: None at time of assessment     Current RX Prescriptions: Denied; mom reported she stopped taking Vistaril during pregnancy.     Adequate Discharge/Visiting Transportation: Yes     TRICIA Signed/Filed: Yes     Qualifies:   Early steps: Yes  SSI: No     NICU Assessment completed in Flowsheets:      Plan:     Patient/caregiver engaged in treatment planning process.      providing psychosocial and supportive counseling, resources, education, assistance and discharge planning as appropriate.  Patient/caregiver state understanding of  available resources,  following, remains available.        Patient/Caregiver informed of right to choose providers or agencies.  Patient/Caregiver provides permission to release any necessary information to [facility] and to Non[facility] agencies as needed to facilitate patient care, treatment planning, and patient discharge planning.  Written and verbal resources provided.         08/29/22 1336   NICU Assessment   Assessment Type Discharge Planning Assessment   Source of Information family   Verified Demographic and Insurance Information Yes  (Updated Contact # [phone])   Insurance Medicaid   Medicaid [employer]   Medicaid Insurance Primary   Spiritual Affiliation [employer]   Lives With mother;father;grandmother;grandfather    Number people in home 5 including patient   Relationship Status of Parents In relationship   Primary Source of Support/Comfort grandparent;significant other;friend   Other children (include names and ages) This is mom's first child   Mother Employed No   Currently Enrolled in School No   Father's Involvement Fully Involved   Is Father signing the birth certificate Yes   Father Name and  Nawaf Clark   Father's Address Same as [place]   Father Currently Enrolled in School No   Family Involvement Moderate   Primary Contact Name and Number MOB and FOB @ [phone]   Other Contacts Names and Numbers Myra (mother's friend) [phone]   Infant Feeding Plan formula feeding   Does baby have crib or safe sleep space? Yes   Do you have a car seat? Yes   Resources/Education Provided Medicaid transportation;Support Resources for NICU Families;WIC;Early Intervention Program;Post Partum Depression   DCFS   (Pending MDS. Mom has hx of amph. positive upon admit for Amph.)   Discharge Plan A Early Steps   Discharge Plan B Home   SDOH   (Mom has a lengthy hx of substance abuse.)   Infant Feeding Plan   Experience Preparing/Using Formula 1st baby.

## 2022-08-29 NOTE — PLAN OF CARE
Problem: Adult Inpatient Plan of Care  Goal: Plan of Care Review  Outcome: Ongoing, Progressing  Goal: Patient-Specific Goal (Individualized)  Outcome: Ongoing, Progressing  Goal: Absence of Hospital-Acquired Illness or Injury  Outcome: Ongoing, Progressing  Goal: Optimal Comfort and Wellbeing  Outcome: Ongoing, Progressing  Goal: Readiness for Transition of Care  Outcome: Ongoing, Progressing     Problem:  Fall Injury Risk  Goal: Absence of Fall, Infant Drop and Related Injury  Outcome: Ongoing, Progressing     Problem: Prelabor Rupture of Membranes  Goal: Delayed Delivery with Absence of Infection  Outcome: Ongoing, Progressing     Problem: Infection  Goal: Absence of Infection Signs and Symptoms  Outcome: Ongoing, Progressing     Problem: Bleeding (Labor)  Goal: Hemostasis  Outcome: Ongoing, Progressing     Problem: Change in Fetal Wellbeing (Labor)  Goal: Stable Fetal Wellbeing  Outcome: Ongoing, Progressing     Problem: Delayed Labor Progression (Labor)  Goal: Effective Progression to Delivery  Outcome: Ongoing, Progressing     Problem: Infection (Labor)  Goal: Absence of Infection Signs and Symptoms  Outcome: Ongoing, Progressing     Problem: Labor Pain (Labor)  Goal: Acceptable Pain Control  Outcome: Ongoing, Progressing     Problem: Uterine Tachysystole (Labor)  Goal: Normal Uterine Contraction Pattern  Outcome: Ongoing, Progressing

## 2022-08-30 VITALS
DIASTOLIC BLOOD PRESSURE: 92 MMHG | RESPIRATION RATE: 18 BRPM | HEART RATE: 98 BPM | OXYGEN SATURATION: 90 % | TEMPERATURE: 99 F | SYSTOLIC BLOOD PRESSURE: 142 MMHG

## 2022-08-30 LAB — BACTERIA UR CULT: NO GROWTH

## 2022-08-30 PROCEDURE — 25000003 PHARM REV CODE 250: Performed by: SPECIALIST

## 2022-08-30 PROCEDURE — 25000003 PHARM REV CODE 250

## 2022-08-30 RX ORDER — IBUPROFEN 600 MG/1
600 TABLET ORAL EVERY 6 HOURS PRN
Qty: 30 TABLET | Refills: 0 | Status: SHIPPED | OUTPATIENT
Start: 2022-08-30 | End: 2023-08-18 | Stop reason: SDUPTHER

## 2022-08-30 RX ORDER — NIFEDIPINE 30 MG/1
30 TABLET, EXTENDED RELEASE ORAL DAILY
Qty: 60 TABLET | Refills: 0 | Status: SHIPPED | OUTPATIENT
Start: 2022-08-30 | End: 2022-10-29

## 2022-08-30 RX ORDER — NIFEDIPINE 30 MG/1
30 TABLET, EXTENDED RELEASE ORAL DAILY
Qty: 30 TABLET | Refills: 2 | Status: SHIPPED | OUTPATIENT
Start: 2022-08-30 | End: 2022-08-30 | Stop reason: SDUPTHER

## 2022-08-30 RX ORDER — MEDROXYPROGESTERONE ACETATE 150 MG/ML
150 INJECTION, SUSPENSION INTRAMUSCULAR ONCE
Status: COMPLETED | OUTPATIENT
Start: 2022-08-30 | End: 2022-08-30

## 2022-08-30 RX ORDER — MEDROXYPROGESTERONE ACETATE 150 MG/ML
150 INJECTION, SUSPENSION INTRAMUSCULAR ONCE
Status: DISCONTINUED | OUTPATIENT
Start: 2022-08-30 | End: 2022-08-30

## 2022-08-30 RX ADMIN — HYDROCODONE BITARTRATE AND ACETAMINOPHEN 1 TABLET: 5; 325 TABLET ORAL at 08:08

## 2022-08-30 RX ADMIN — HYDROCODONE BITARTRATE AND ACETAMINOPHEN 1 TABLET: 5; 325 TABLET ORAL at 12:08

## 2022-08-30 RX ADMIN — PRENATAL VITAMINS-IRON FUMARATE 27 MG IRON-FOLIC ACID 0.8 MG TABLET 1 TABLET: at 08:08

## 2022-08-30 RX ADMIN — MEDROXYPROGESTERONE ACETATE 150 MG: 150 INJECTION, SUSPENSION, EXTENDED RELEASE INTRAMUSCULAR at 05:08

## 2022-08-30 NOTE — PROGRESS NOTES
Inpatient Nutrition Evaluation    Admit Date: 2022   Nutrition Recommendation/Prescription     Continue regular diet as tolerated  RD to monitor po intake and weight changes    Nutrition Assessment     Chart Review    Reason Seen: length of stay    Diagnosis: Post-partum day 2 after       Relevant Medical History:  incarceration/polysubstance use    Nutrition-Related Medications: colace PRN, zofran PRN, prenatal daily, NaCl PRN    Nutrition-Related Labs: : WBC-25.1, RBC-3.83, BUN-5.1      Diet Order: Diet Adult Regular  Oral Supplement Order: none at this time  Appetite/Oral Intake: good/% of meals  Factors Affecting Nutritional Intake: none identified at this time  Food/Confucianist/Cultural Preferences: none reported       Wound(s):   n/a    Comments    : Unable to speak with pt at this time. Per notes, pt with good appetite, tolerating diet, and having BM. No n/v. Admit weight of 68.1 kg (150 lb) on . Weight stable.    Anthropometrics      Height Method: Stated  Last   Weight Method: Standard Scale     BMI Classification: overweight (BMI 25-29.9)                               Wt Readings from Last 5 Encounters:   22 68.1 kg (150 lb 0.4 oz)   22 69.8 kg (153 lb 12.8 oz)   22 64 kg (141 lb 1.5 oz)   22 66.2 kg (146 lb)     Weight Change(s) Since Admission:  Admit      Patient Education    Not applicable.    Monitoring & Evaluation     Dietitian will monitor food and beverage intake and weight change.  Nutrition Risk/Follow-Up: low (follow-up in 5-7 days)  Patients assigned 'low nutrition risk' status do not qualify for a full nutritional assessment but will be monitored and re-evaluated in a 5-7 day time period.    Susie Tavarez, Registration Eligible, Provisional LDN

## 2022-08-30 NOTE — PROGRESS NOTES
Post-Partum Progress Note        Subjective:      Post-Op Day #2 after      Patient currently doing well without complaints. Pain is well controlled.  She is ambulating, tolerating regular diet, and having BM. States she has scant vaginal discharge.  Overnight, blood pressure 130s-140s systolic, but overall improving. She denies headache, vision changes, RUQ pain. P/C WNL.    Objective:      Temp:  [98.1 °F (36.7 °C)-98.5 °F (36.9 °C)] 98.2 °F (36.8 °C)  Pulse:  [78-98] 90  Resp:  [17-18] 18  BP: (131-145)/(77-92) 135/89  No intake or output data in the 24 hours ending 22 0951    There is no height or weight on file to calculate BMI.    General: no acute distress  Cardio: regular rate and rhythm  Resp: unlabored work of breathing  Abdomen: soft, non-tender, non-distended. Fundus palpable below umbilicus.    Extremities: non-tender, symmetric, no edema    Recent Results (from the past 336 hour(s))   CBC with Differential    Collection Time: 22  9:41 AM   Result Value Ref Range    WBC 25.1 (H) 4.5 - 11.5 x10(3)/mcL    Hgb 12.9 12.0 - 16.0 gm/dL    Hct 39.2 37.0 - 47.0 %    Platelet 357 130 - 400 x10(3)/mcL   CBC with Differential    Collection Time: 22  5:39 AM   Result Value Ref Range    WBC 26.9 (H) 4.5 - 11.5 x10(3)/mcL    Hgb 12.7 12.0 - 16.0 gm/dL    Hct 37.7 37.0 - 47.0 %    Platelet 328 130 - 400 x10(3)/mcL   CBC with Differential    Collection Time: 22  6:51 AM   Result Value Ref Range    WBC 22.2 (H) 4.5 - 11.5 x10(3)/mcL    Hgb 13.7 12.0 - 16.0 gm/dL    Hct 40.0 37.0 - 47.0 %    Platelet 359 130 - 400 x10(3)/mcL     Urine Protein/Creatinine ratio 212.9  Cr, Urine 94.4  Protein, Urine 20.1    Quantiferon Gold TB Neg    Assessment:     31 y.o. female post-partum day 2 after - Doing Well      Plan:     1. Continue routine post-operative care  2. Monitor blood pressure  3. Monitor urine output  4. DCFS pending baby CASIE Stuart MD  [facility] Medicine HO-1

## 2022-08-30 NOTE — DISCHARGE SUMMARY
[facility] - 2nd Floor Mother/Baby Unit  Obstetrics  Discharge Summary      Patient Name: Nancy Ramos  MRN: [id number]  Admission Date: 2022  Hospital Length of Stay: 6 days  Discharge Date and Time:  2022 4:53 PM  Attending Physician: Hany Cheney MD   Discharging Provider: Nayeli Stuart MD   Primary Care Provider: Hany Cheney MD    HPI: Nancy Ramos is a 31 y.o.  female with IUP at 32w4d weeks gestation who presents to antepartum for admission.  She was seen by MFM for follow up and noted to have FGR with EFY at 9th percentila and oligohydramnios noting possible leaking since 930 today.  Upon presentation ROM Plus was positive. Pertinent medical history for this pregnancy includes substance use with tobacco, UDS positive for Amphetamines consistently and intermittent opiate, THC, benzodiazepine and Fentanyl.  Patient reports scant bleeding today and normal fetal movement.  She denies contractions.  Care this pregnancy has been with [facility] FM clinic.  She has not had outine labs done and she reports Rh negative status and has not received Rhogam since early in the pregnancy.     * No surgery found *     Hospital Course:   Patient was admitted for PPROM with unknown onset of labor. She received latency ABX, antepartum corticosteroids, and NICU was consulted. She remained afebrile, and she was monitored for signs and symptoms of infection. For FGR, she received EFM monitoring and BPP/UAD once weekly. Patient had a history of incarceration this year, and she had some diffuse wheezing on lung exam, so TB workup was done, which was negative. Patient also had  history of syphilis treated in May-- was treated per Public Health record , ,  with bicillin. She reported still having intercourse with partner with unknown treatment and her titer was 1:8 this admission, so she received bicillin x3 weekly doses. She was Rh negative so received Rhogam. Patient has an  extensive history of substance abuse (+opiates/fentanyl/benzo/THC in past), and her UDS was positive for amphetamines this admit. Social work was consulted. On 8/28/22, patient began having abdominal pain and uterine contractions. Her CBC showed an elevated white count. The patient was transferred to Labor and delivery environment where she progressed with Pitocin augmentation to vaginal delivery. She had normal spontaneous vaginal delivery of live infant without complications. She received MMR for nonimmune Rubella status. Her post-partum course was complicated by multiple elevating blood pressures in 140's up to 160's systolic. Blood pressure was monitored. Pre-eclampsia workup was negative. She is asymptomatic. She otherwise did well on routine post-partum care. Pain was well controlled. On day of discharge, she is ambulating, tolerating regular diet, and passing flatus. Her blood pressure is still elevated, but overall improving. She is to be discharged with nifedipine 30 daily, with close post-partum follow up with [facility] FM clinic. A discussion was had with patient about contraception going forward, and she opted for 1 time dose of depo-provera, follow by bilateral tubal ligation. Consent for bilateral tubal ligation  was signed, and referral given to Gynecology as outpatient in the following weeks. She was then given depo-provera as contraception before her discharge. She plans to formula feed.      Physical Exam:  Vitals:    08/30/22 0745 08/30/22 0830 08/30/22 1229 08/30/22 1515   BP: 135/89   (!) 142/92   Pulse: 90   98   Resp: 18 18 18 18   Temp: 98.2 °F (36.8 °C)   98.6 °F (37 °C)   TempSrc: Oral   Oral   SpO2:       General: no acute distress  Cardio: regular rate and rhythm  Resp: unlabored work of breathing  Abdomen: soft, non-tender, non-distended. Fundus palpable below umbilicus. Bowel sounds normal.  Extremities: non-tender, symmetric, no edema      Consults (From admission, onward)          Status  Ordering Provider     Inpatient consult to Social Work/Case Management  Once        Provider:  (Not yet assigned)    BISI Marion     Inpatient consult to Maternal Fetal Medicine  Once        Provider:  (Not yet assigned)    CHANG Quevedo     Inpatient consult to Neonatology  Once        Provider:  (Not yet assigned)    CHANG Quevedo            Final Active Diagnoses:    Diagnosis Date Noted POA    PRINCIPAL PROBLEM:   premature rupture of membranes (PPROM) with unknown onset of labor [O42.919] 2022 Yes    Tobacco smoking affecting pregnancy in third trimester [O99.333] 2022 Yes    Positive GBS test [B95.1] 2022 No    Rubella non-immune status, antepartum [Z28.39] 2022 Not Applicable    Pregnancy with 32 completed weeks gestation [Z3A.32] 2022 Not Applicable    Pregnancy affected by fetal growth restriction [O36.5990] 2022 Yes    Substance abuse affecting pregnancy in third trimester, antepartum [O99.323] 2022 Yes    Oligohydramnios antepartum, third trimester, not applicable or unspecified fetus [O41.03X0] 2022 Yes    Drug abuse during pregnancy [O99.320, F19.10] 2022 Yes    Intrauterine growth restriction affecting antepartum care of mother in third trimester [O36.5930] 2022 Yes    Syphilis affecting pregnancy in third trimester [O98.113] 2022 Yes    Heartburn during pregnancy in third trimester [O26.893, R12] 2022 Unknown    Tobacco user [Z72.0] 2022 Yes    Generalized anxiety disorder [F41.1] 2022 Yes    Depressive disorder [F32.9] 2016 Yes      Problems Resolved During this Admission:        Significant Diagnostic Studies:   Lab Results   Component Value Date    HGB 12.9 2022    HCT 39.2 2022    WBC 25.1 (H) 2022     2022    ALT 14 2022    AST 16 2022  Urine Protein/Creatinine ratio 212.9  Cr, Urine 94.4  Protein, Urine  20.1    Feeding Method: bottle    Immunizations       Date Immunization Status Dose Route/Site Given by    08/24/22 2242 Rho (D) Immune Globulin - IM Given 300 mcg Intramuscular/Right upper quad. gluteus Ananya Peters RN    08/25/22 0845 PPD Test Deleted       08/28/22 1710 MMR Incomplete 0.5 mL Subcutaneous/     08/28/22 1710 Tdap Incomplete 0.5 mL Intramuscular/     08/29/22 0900 Rho (D) Immune Globulin - IM Incomplete 300 mcg Intravenous/             Delivery:    Episiotomy: None   Lacerations: None   Repair suture:     Repair # of packets: 1   Blood loss (ml):       Birth information:  YOB: 2022   Time of birth: 3:53 PM   Sex: female   Delivery type: Vaginal, Spontaneous   Gestational Age: 33w1d    Delivery Clinician:      Other providers:       Additional  information:  Forceps:    Vacuum:    Breech:    Observed anomalies      Living?:           APGARS  One minute Five minutes Ten minutes   Skin color:         Heart rate:         Grimace:         Muscle tone:         Breathing:         Totals: 8  9        Placenta: Delivered:       appearance  Pending Diagnostic Studies:       None            Discharged Condition: fair    Disposition: Home or Self Care    Follow Up:   Follow-up Information       [facility] Follow up in 1 week(s).    Why: [facility]. Follow up in 1 week for close post-partum follow up due to elevated blood pressure.  Contact information:  [address] [facility] [address] [phone]               [facility]. Schedule an appointment as soon as possible for a visit in 5 week(s).    Specialty: Gynecology  Why: Please make an appointment with [facility]cology in about 5 weeks for bilateral tubal ligation. Bring your copy of the signed Federal Consent Form.  Contact information:  [facility] [facility] [address] [phone]                         Patient Instructions:   No discharge procedures on  file.  Medications:  Current Discharge Medication List        START taking these medications    Details   ibuprofen (ADVIL,MOTRIN) 600 MG tablet Take 1 tablet (600 mg total) by mouth every 6 (six) hours as needed for Pain.  Qty: 30 tablet, Refills: 0      NIFEdipine (PROCARDIA-XL) 30 MG (OSM) 24 hr tablet Take 1 tablet (30 mg total) by mouth once daily.  Qty: 60 tablet, Refills: 0    Comments: .           CONTINUE these medications which have NOT CHANGED    Details   prenatal 25/iron fum/folic/dha (PRENATAL-1 ORAL) Take 1 tablet by mouth once daily.           STOP taking these medications       lansoprazole (PREVACID 24HR) 15 MG capsule Comments:   Reason for Stopping:               Nayeli Stuart MD  Obstetrics  [facility] - 2nd Floor Mother/Baby Unit

## 2022-08-30 NOTE — DISCHARGE INSTRUCTIONS
Please monitor your blood pressure at home. Take your blood pressure medication as instructed. If any signs of headache, vision changes/disturbances, or right upper quadrant abdominal pain, go the ED.

## 2022-08-30 NOTE — HOSPITAL COURSE
Patient was admitted for PPROM with unknown onset of labor. She received latency ABX, antepartum corticosteroids, and NICU was consulted. She remained afebrile, and she was monitored for signs and symptoms of infection. For FGR, she received EFM monitoring and BPP/UAD once weekly. Patient had a history of incarceration this year, and she had some diffuse wheezing on lung exam, so TB workup was done, which was negative. Patient also had  history of syphilis treated in May-- was treated per Public Health record 6/13, 6/22, 6/29 with bicillin. She reported still having intercourse with partner with unknown treatment and her titer was 1:8 this admission, so she received bicillin x3 weekly doses. She was Rh negative so received Rhogam. Patient has an extensive history of substance abuse (+opiates/fentanyl/benzo/THC in past), and her UDS was positive for amphetamines this admit. Social work was consulted. On 8/28/22, patient began having abdominal pain and uterine contractions. Her CBC showed an elevated white count. The patient was transferred to Labor and delivery environment where she progressed with Pitocin augmentation to vaginal delivery. She had normal spontaneous vaginal delivery of live infant without complications. She received MMR for nonimmune Rubella status. Her post-partum course was complicated by multiple elevating blood pressures in 140's up to 160's systolic. Blood pressure was monitored. Pre-eclampsia workup was negative. She is asymptomatic. She otherwise did well on routine post-partum care. Pain was well controlled. On day of discharge, she is ambulating, tolerating regular diet, and passing flatus. Her blood pressure is still elevated, but overall improving. She is to be discharged with nifedipine 30 daily, with close post-partum follow up with [facility] FM clinic. A discussion was had with patient about contraception going forward, and she opted for 1 time dose of depo-provera, follow by bilateral tubal  ligation. Consent for bilateral tubal ligation  was signed, and referral given to Gynecology as outpatient in the following weeks. She was then given depo-provera as contraception before her discharge. She plans to formula feed.

## 2022-08-30 NOTE — PROGRESS NOTES
DCFS Report has been filed due to mother's recent drug hx during pregnancy. Intake # [id number]; No worker has been assigned at this time. Another report will be filed if necessary pending MDS.

## 2022-08-30 NOTE — HPI
Nancy Ramos is a 31 y.o.  female with IUP at 32w4d weeks gestation who presents to antepartum for admission.  She was seen by MFM for follow up and noted to have FGR with EFY at 9th percentila and oligohydramnios noting possible leaking since 930 today.  Upon presentation ROM Plus was positive. Pertinent medical history for this pregnancy includes substance use with tobacco, UDS positive for Amphetamines consistently and intermittent opiate, THC, benzodiazepine and Fentanyl.  Patient reports scant bleeding today and normal fetal movement.  She denies contractions.  Care this pregnancy has been with [facility] FM clinic.  She has not had outine labs done and she reports Rh negative status and has not received Rhogam since early in the pregnancy.

## 2022-08-31 LAB
ESTROGEN SERPL-MCNC: NORMAL PG/ML
INSULIN SERPL-ACNC: NORMAL U[IU]/ML
LAB AP CLINICAL INFORMATION: NORMAL
LAB AP GROSS DESCRIPTION: NORMAL
LAB AP REPORT FOOTNOTES: NORMAL
T3RU NFR SERPL: NORMAL %

## 2022-09-18 ENCOUNTER — PATIENT MESSAGE (OUTPATIENT)
Dept: MATERNAL FETAL MEDICINE | Facility: CLINIC | Age: 32
End: 2022-09-18
Payer: MEDICAID

## 2022-09-20 ENCOUNTER — PATIENT MESSAGE (OUTPATIENT)
Dept: MATERNAL FETAL MEDICINE | Facility: CLINIC | Age: 32
End: 2022-09-20
Payer: MEDICAID

## 2022-11-28 PROBLEM — O36.5930 INTRAUTERINE GROWTH RESTRICTION AFFECTING ANTEPARTUM CARE OF MOTHER IN THIRD TRIMESTER: Status: RESOLVED | Noted: 2022-08-24 | Resolved: 2022-11-28

## 2022-11-28 PROBLEM — O41.03X0 OLIGOHYDRAMNIOS ANTEPARTUM, THIRD TRIMESTER, NOT APPLICABLE OR UNSPECIFIED FETUS: Status: RESOLVED | Noted: 2022-08-24 | Resolved: 2022-11-28

## 2022-12-21 NOTE — PROGRESS NOTES
Original encounter on date 4/1/2022 in [facility] EMR.  Information placed in EPIC for continuity purposes.               Document Has Been Revised    HealthE Care Entered On:  4/1/2022 13:28 CDT    Performed On:  4/1/2022 13:26 CDT by Etelvina Dubose LPN               Discharge Past 30 Days   Visit to the Hospital in the Last 30 Days :   Emergency department (ED) visit   Reason for Choosing ED for Care :   Believes the problem too serious for doctor office or clinic   Perception of Change in Health Status DC :   Improving   Discharged To :   Home independently   DC Instructions :   Received discharge instructions , Understands discharge instructions    Etelvina Dubose LPN - 4/1/2022 13:26 CDT   Education Provided :   Importance of keeping appointments, Community resources   (Comment: numberts provided for [facility] and Critical access hospital [Etelvina Dubose LPN - 4/1/2022 13:30 CDT] )   Etelvina Dubose LPN - 4/1/2022 13:30 CDT   Discharge Past 30 Days Addntl Comments :   denies vaginal bleeding.     Patient pregnant :   Yes   Etelvina Dubose LPN - 4/1/2022 13:26 CDT   Barriers to Care   SDoH Eat Less Than You Should :   No   (Comment: applied for food stamps. Will use food smith if needed [Etelvina Dubose LPN - 4/1/2022 13:30 CDT] )   SDoH Shut Off Services to Your Home :   No   SDoH No Regular Place to Live :   No   (Comment: living with parents [Etelvina Dubose LPN - 4/1/2022 13:30 CDT] )   SDoH Needed Provider but Costs too Much :   No   SDoH Help Reading and Writing Paper Work :   No   SDoH Feel Lonely Often :   No   SDoH Missed Appt/Meds- No Transportation :   No   SDoH Call Dr To Be Seen Right Away :   No   (Comment: no PCP [Etelvina Dubose LPN - 4/1/2022 13:30 CDT] )   SDoH Medical Problems Cause ED Visit :   No   SDoH Other Problems Affecting Health :   Yes   (Comment: stated PTSD, Depression; not on medication since she found out she is pregnant [Etelvina Dubose LPN - 4/1/2022  13:30 CDT] )   Etelvina Dubose LPN - 4/1/2022 13:30 CDT   SDoH Reviewed :   Yes   Etelvina Dubose LPN - 4/1/2022 13:26 CDT   SDoH Dentist Seen in Last Year :   No   (Comment: numbers provided for [facility] [Etelvina Dubose LPN - 4/1/2022 13:30 CDT] )   Etelvina Dubose LPN - 4/1/2022 13:30 CDT   Prescriptions   Medication Reconcilation Completed :   Unknown   Medication Prescriptions Filled After DC :   Other: stated will  today   Questions About Meds Prescribed at DC :   Denies any questions or concerns                    Etelvina Dubose LPN - 4/1/2022 13:26 CDT   Appointments   Follow-Up Appt Scheduled :   Yes   Follow-Up Provider Appt Scheduled By :   Patient   Follow-Up Date :   4/28/2022 CDT   Follow-Up Appointment Status :   Confirms scheduled appointment    PCP Visit Upcoming :   No   Follow-Up Specialist Appt Scheduled :   Yes   Type of Specialist :   OB, [facility] Children[facility]; unsure of 's name      [facility] referral sent and pt. awaiting a call      *stated will go to earliest appointment   Follow-Up Date :   4/28/2022 CDT   Etelvina Dubose LPN - 4/1/2022 13:30 CDT   Navigation   Initial Assesment Completed By :   Phone   ED FIN :   [id number]   MCIP Navigation Call Log :   Initial MCIP contact   Referral To  Care :   MCIP Navigation   Transportation Arrangements Made :   Yes   (Comment: public transportation [Etelvina Dubose LPN - 4/1/2022 13:30 CDT] )   Root Causes for High-ED Utilization :   Other: no PCP, problem serious   Etelvina Dubose LPN - 4/1/2022 13:30 CDT

## 2022-12-21 NOTE — PROGRESS NOTES
"Original encounter on date 4/20/2022 in Draft EMR.  Information placed in EPIC for continuity purposes.                 HealthE Care Entered On:  4/20/2022 15:04 CDT    Performed On:  4/20/2022 15:01 CDT by Leslie Gamble LPN               Discharge Past 30 Days   Visit to the Hospital in the Last 30 Days :   Emergency department (ED) visit   Perception of Change in Health Status DC :   Improving   Discharged To :   Home independently   DC Instructions :   Received discharge instructions , Understands discharge instructions    Education Provided :   Chronic disease process, ED utilization, Importance of keeping appointments, Medication Compliance, Diet Compliance   Discharge Past 30 Days Addntl Comments :   Spoke to patient for f/u call. SHe is about 14 weeks pregnant. She has not seen an OB yet but states she has an appt Monday 4/25/22 with an OB at [facility]. Pt states also has appt next week to establish care at [facility]. Denies bleeding or vaginal leakage. Pt states occ nausea, no vomiting lately. DIscussed ED precautions and importance of attending appts. Pt states she is staying "from place to place" no secure housing at this time. She states she is staying at a safe place at this time, not permenant. With her permission, referring pt to [facility] to see if they can assist her with this. Will f/u.     Patient pregnant :   Yes   Leslie Gamble LPN - 4/20/2022 15:01 CDT   Barriers to Care   SDoH Eat Less Than You Should :   No   SDoH Shut Off Services to Your Home :   No   SDoH No Regular Place to Live :   Yes   SDoH Needed Provider but Costs too Much :   No   SDoH Help Reading and Writing Paper Work :   No   SDoH Feel Lonely Often :   No   SDoH Missed Appt/Meds- No Transportation :   No   SDoH Call Dr To Be Seen Right Away :   No   SDoH Medical Problems Cause ED Visit :   Yes   SDoH Other Problems Affecting Health :   Yes   SDoH Reviewed :   Yes   SDoH Dentist Seen in Last Year :   " No   Tye FENGLeslieterson - 4/20/2022 15:01 CDT   Appointments   Follow-Up Appt Scheduled :   Yes   Follow-Up Date :   4/26/2022 CDT   Follow-Up Appointment Status :   Confirms scheduled appointment    PCP Visit Upcoming :   Yes   Appointment Date/Time :   4/26/2022 CDT   PCP Visit Within Year :   No   Follow-Up Specialist Appt Scheduled :   Yes   Type of Specialist :   OB/Gyn   Follow-Up Date :   4/26/2022 CDT   Tye FENGLeslieterson - 4/20/2022 15:01 CDT   Navigation   Initial Assesment Completed By :   Phone   ED FIN :   [id number]   MCIP Navigation Call Log :   First follow up call   Referral To  Care :   MCIP Navigation   Transportation Arrangements Made :   Yes   Root Causes for High-ED Utilization :   Chronic conditions, Lack of access to care   Plan: :   Educated on appropriate ED utilization, Educated on alternate means of health care; ie urgent care when PCP not available, Educated on importance of follow up care with PCP, Referred to community resources; ie [place], Educated on importance of follow up preventative dental care with dentist, Budget-friendly health eating education given   Participation in Activity Designed to Address Lack of Annual Ambulatory or Preventative Care Visit? :   Yes   Participation in Activity Designed to Address Avoidable ED Utilization? :   Yes   During Current Measurement Year, Did Enrollee Receive Education Regarding Outpatient Primary Care Options? :   Yes   During Current Measurement Year, Did Enrollee Receive an Appt Reminder 24-48 Hours Before a Scheduled Appt? :   Yes   During Current Measurement Year, Did the Network Provider Schedule and Appt or Provide a Referral to Enrollee? :   Yes   Education Provided :   Verbal   Leslie Gamble LPN - 4/20/2022 15:01 CDT

## 2023-04-06 ENCOUNTER — DOCUMENTATION ONLY (OUTPATIENT)
Dept: ADMINISTRATIVE | Facility: HOSPITAL | Age: 33
End: 2023-04-06
Payer: MEDICAID

## 2023-07-31 PROBLEM — Z3A.32 PREGNANCY WITH 32 COMPLETED WEEKS GESTATION: Status: RESOLVED | Noted: 2022-08-24 | Resolved: 2023-07-31

## 2023-08-07 PROBLEM — O42.919 PRETERM PREMATURE RUPTURE OF MEMBRANES (PPROM) WITH UNKNOWN ONSET OF LABOR: Status: RESOLVED | Noted: 2022-08-24 | Resolved: 2023-08-07

## 2023-08-18 ENCOUNTER — HOSPITAL ENCOUNTER (EMERGENCY)
Facility: HOSPITAL | Age: 33
Discharge: HOME | End: 2023-08-18
Attending: SPECIALIST
Payer: MEDICAID

## 2023-08-18 VITALS
SYSTOLIC BLOOD PRESSURE: 111 MMHG | HEIGHT: 62 IN | HEART RATE: 117 BPM | RESPIRATION RATE: 13 BRPM | DIASTOLIC BLOOD PRESSURE: 64 MMHG | TEMPERATURE: 99 F | WEIGHT: 120 LBS | OXYGEN SATURATION: 93 % | BODY MASS INDEX: 22.08 KG/M2

## 2023-08-18 DIAGNOSIS — N12 PYELONEPHRITIS: ICD-10-CM

## 2023-08-18 DIAGNOSIS — R82.5 POSITIVE URINE DRUG SCREEN: Primary | ICD-10-CM

## 2023-08-18 DIAGNOSIS — R00.0 TACHYCARDIA: ICD-10-CM

## 2023-08-18 LAB
ABS NEUT CALC (OHS): 21.78 X10(3)/MCL (ref 2.1–9.2)
ALBUMIN SERPL-MCNC: 3.3 G/DL (ref 3.5–5)
ALBUMIN/GLOB SERPL: 0.8 RATIO (ref 1.1–2)
ALP SERPL-CCNC: 137 UNIT/L (ref 40–150)
ALT SERPL-CCNC: 15 UNIT/L (ref 0–55)
AMPHET UR QL SCN: POSITIVE
APPEARANCE UR: CLEAR
AST SERPL-CCNC: 18 UNIT/L (ref 5–34)
B-HCG SERPL QL: NEGATIVE
BACTERIA #/AREA URNS AUTO: ABNORMAL /HPF
BARBITURATE SCN PRESENT UR: NEGATIVE
BENZODIAZ UR QL SCN: NEGATIVE
BILIRUB SERPL-MCNC: 0.5 MG/DL
BILIRUB UR QL STRIP.AUTO: NEGATIVE
BUN SERPL-MCNC: 9.4 MG/DL (ref 7–18.7)
CALCIUM SERPL-MCNC: 8.8 MG/DL (ref 8.4–10.2)
CANNABINOIDS UR QL SCN: NEGATIVE
CHLORIDE SERPL-SCNC: 98 MMOL/L (ref 98–107)
CO2 SERPL-SCNC: 22 MMOL/L (ref 22–29)
COCAINE UR QL SCN: NEGATIVE
COLOR UR: YELLOW
CREAT SERPL-MCNC: 0.72 MG/DL (ref 0.55–1.02)
ERYTHROCYTE [DISTWIDTH] IN BLOOD BY AUTOMATED COUNT: 14 % (ref 11.5–17)
GFR SERPLBLD CREATININE-BSD FMLA CKD-EPI: >60 MLS/MIN/1.73/M2
GLOBULIN SER-MCNC: 4 GM/DL (ref 2.4–3.5)
GLUCOSE SERPL-MCNC: 125 MG/DL (ref 74–100)
GLUCOSE UR QL STRIP.AUTO: NEGATIVE
HCT VFR BLD AUTO: 43.9 % (ref 37–47)
HGB BLD-MCNC: 13.9 G/DL (ref 12–16)
KETONES UR QL STRIP.AUTO: NEGATIVE
LEUKOCYTE ESTERASE UR QL STRIP.AUTO: ABNORMAL
LIPASE SERPL-CCNC: 8 U/L
LYMPHOCYTES NFR BLD MANUAL: 1.47 X10(3)/MCL
LYMPHOCYTES NFR BLD MANUAL: 6 % (ref 13–40)
MCH RBC QN AUTO: 31.6 PG (ref 27–31)
MCHC RBC AUTO-ENTMCNC: 31.7 G/DL (ref 33–36)
MCV RBC AUTO: 99.8 FL (ref 80–94)
MONOCYTES NFR BLD MANUAL: 1.22 X10(3)/MCL (ref 0.1–1.3)
MONOCYTES NFR BLD MANUAL: 5 % (ref 2–11)
NEUTROPHILS NFR BLD MANUAL: 77 % (ref 47–80)
NEUTS BAND NFR BLD MANUAL: 12 % (ref 0–11)
NITRITE UR QL STRIP.AUTO: NEGATIVE
OPIATES UR QL SCN: NEGATIVE
PCP UR QL: NEGATIVE
PH UR STRIP.AUTO: 6 [PH]
PH UR: 6 [PH] (ref 3–11)
PLATELET # BLD AUTO: 425 X10(3)/MCL (ref 130–400)
PMV BLD AUTO: 9.1 FL (ref 7.4–10.4)
POTASSIUM SERPL-SCNC: 4.1 MMOL/L (ref 3.5–5.1)
PROT SERPL-MCNC: 7.3 GM/DL (ref 6.4–8.3)
PROT UR QL STRIP.AUTO: ABNORMAL
RBC # BLD AUTO: 4.4 X10(6)/MCL (ref 4.2–5.4)
RBC #/AREA URNS AUTO: ABNORMAL /HPF
RBC UR QL AUTO: ABNORMAL
SODIUM SERPL-SCNC: 132 MMOL/L (ref 136–145)
SP GR UR STRIP.AUTO: 1.01
SPECIFIC GRAVITY, URINE AUTO (.000) (OHS): 1.01 (ref 1–1.03)
SQUAMOUS #/AREA URNS AUTO: ABNORMAL /HPF
UROBILINOGEN UR STRIP-ACNC: 1
WBC # SPEC AUTO: 24.47 X10(3)/MCL (ref 4.5–11.5)
WBC #/AREA URNS AUTO: ABNORMAL /HPF

## 2023-08-18 PROCEDURE — 85027 COMPLETE CBC AUTOMATED: CPT | Performed by: SPECIALIST

## 2023-08-18 PROCEDURE — 80053 COMPREHEN METABOLIC PANEL: CPT | Performed by: SPECIALIST

## 2023-08-18 PROCEDURE — 63600175 PHARM REV CODE 636 W HCPCS: Performed by: SPECIALIST

## 2023-08-18 PROCEDURE — 96361 HYDRATE IV INFUSION ADD-ON: CPT

## 2023-08-18 PROCEDURE — 96374 THER/PROPH/DIAG INJ IV PUSH: CPT

## 2023-08-18 PROCEDURE — 93010 ELECTROCARDIOGRAM REPORT: CPT | Mod: ,,, | Performed by: STUDENT IN AN ORGANIZED HEALTH CARE EDUCATION/TRAINING PROGRAM

## 2023-08-18 PROCEDURE — 81001 URINALYSIS AUTO W/SCOPE: CPT | Mod: 59 | Performed by: SPECIALIST

## 2023-08-18 PROCEDURE — 25000003 PHARM REV CODE 250: Performed by: SPECIALIST

## 2023-08-18 PROCEDURE — 99285 EMERGENCY DEPT VISIT HI MDM: CPT | Mod: 25

## 2023-08-18 PROCEDURE — 81025 URINE PREGNANCY TEST: CPT | Performed by: SPECIALIST

## 2023-08-18 PROCEDURE — 80307 DRUG TEST PRSMV CHEM ANLYZR: CPT | Performed by: SPECIALIST

## 2023-08-18 PROCEDURE — 93010 EKG 12-LEAD: ICD-10-PCS | Mod: ,,, | Performed by: STUDENT IN AN ORGANIZED HEALTH CARE EDUCATION/TRAINING PROGRAM

## 2023-08-18 PROCEDURE — 83690 ASSAY OF LIPASE: CPT | Performed by: SPECIALIST

## 2023-08-18 PROCEDURE — 93005 ELECTROCARDIOGRAM TRACING: CPT

## 2023-08-18 PROCEDURE — 96375 TX/PRO/DX INJ NEW DRUG ADDON: CPT

## 2023-08-18 RX ORDER — KETOROLAC TROMETHAMINE 30 MG/ML
30 INJECTION, SOLUTION INTRAMUSCULAR; INTRAVENOUS
Status: COMPLETED | OUTPATIENT
Start: 2023-08-18 | End: 2023-08-18

## 2023-08-18 RX ORDER — CIPROFLOXACIN 500 MG/1
500 TABLET ORAL
Status: COMPLETED | OUTPATIENT
Start: 2023-08-18 | End: 2023-08-18

## 2023-08-18 RX ORDER — LORAZEPAM 2 MG/ML
0.5 INJECTION INTRAMUSCULAR
Status: COMPLETED | OUTPATIENT
Start: 2023-08-18 | End: 2023-08-18

## 2023-08-18 RX ORDER — IBUPROFEN 600 MG/1
600 TABLET ORAL EVERY 6 HOURS PRN
Qty: 20 TABLET | Refills: 0 | Status: SHIPPED | OUTPATIENT
Start: 2023-08-18

## 2023-08-18 RX ORDER — CIPROFLOXACIN 500 MG/1
500 TABLET ORAL 2 TIMES DAILY
Qty: 10 TABLET | Refills: 0 | Status: SHIPPED | OUTPATIENT
Start: 2023-08-18 | End: 2023-08-23

## 2023-08-18 RX ADMIN — KETOROLAC TROMETHAMINE 30 MG: 30 INJECTION, SOLUTION INTRAMUSCULAR; INTRAVENOUS at 04:08

## 2023-08-18 RX ADMIN — CIPROFLOXACIN HYDROCHLORIDE 500 MG: 500 TABLET, FILM COATED ORAL at 04:08

## 2023-08-18 RX ADMIN — SODIUM CHLORIDE 1000 ML: 9 INJECTION, SOLUTION INTRAVENOUS at 03:08

## 2023-08-18 RX ADMIN — LORAZEPAM 0.5 MG: 2 INJECTION INTRAMUSCULAR; INTRAVENOUS at 04:08

## 2023-12-01 ENCOUNTER — HOSPITAL ENCOUNTER (EMERGENCY)
Facility: HOSPITAL | Age: 33
Discharge: HOME | End: 2023-12-01
Attending: EMERGENCY MEDICINE
Payer: MEDICAID

## 2023-12-01 VITALS
DIASTOLIC BLOOD PRESSURE: 86 MMHG | SYSTOLIC BLOOD PRESSURE: 137 MMHG | TEMPERATURE: 98 F | WEIGHT: 115 LBS | HEIGHT: 62 IN | OXYGEN SATURATION: 96 % | RESPIRATION RATE: 20 BRPM | HEART RATE: 123 BPM | BODY MASS INDEX: 21.16 KG/M2

## 2023-12-01 DIAGNOSIS — A53.9 SYPHILIS: Primary | ICD-10-CM

## 2023-12-01 LAB
ALBUMIN SERPL-MCNC: 2.9 G/DL (ref 3.5–5)
ALBUMIN/GLOB SERPL: 0.6 RATIO (ref 1.1–2)
ALP SERPL-CCNC: 168 UNIT/L (ref 40–150)
ALT SERPL-CCNC: 14 UNIT/L (ref 0–55)
AMPHET UR QL SCN: POSITIVE
APPEARANCE UR: CLEAR
AST SERPL-CCNC: 13 UNIT/L (ref 5–34)
B-HCG SERPL QL: NEGATIVE
BACTERIA #/AREA URNS AUTO: ABNORMAL /HPF
BARBITURATE SCN PRESENT UR: NEGATIVE
BASOPHILS # BLD AUTO: 0.04 X10(3)/MCL
BASOPHILS NFR BLD AUTO: 0.4 %
BENZODIAZ UR QL SCN: NEGATIVE
BILIRUB SERPL-MCNC: 0.1 MG/DL
BILIRUB UR QL STRIP.AUTO: NEGATIVE
BUN SERPL-MCNC: 13.3 MG/DL (ref 7–18.7)
C TRACH DNA SPEC QL NAA+PROBE: NOT DETECTED
CALCIUM SERPL-MCNC: 8.6 MG/DL (ref 8.4–10.2)
CANNABINOIDS UR QL SCN: NEGATIVE
CHLORIDE SERPL-SCNC: 106 MMOL/L (ref 98–107)
CO2 SERPL-SCNC: 28 MMOL/L (ref 22–29)
COCAINE UR QL SCN: POSITIVE
COLOR UR AUTO: YELLOW
CREAT SERPL-MCNC: 0.73 MG/DL (ref 0.55–1.02)
EOSINOPHIL # BLD AUTO: 0.3 X10(3)/MCL (ref 0–0.9)
EOSINOPHIL NFR BLD AUTO: 2.8 %
ERYTHROCYTE [DISTWIDTH] IN BLOOD BY AUTOMATED COUNT: 14.3 % (ref 11.5–17)
FENTANYL UR QL SCN: POSITIVE
GFR SERPLBLD CREATININE-BSD FMLA CKD-EPI: >60 MLS/MIN/1.73/M2
GLOBULIN SER-MCNC: 4.5 GM/DL (ref 2.4–3.5)
GLUCOSE SERPL-MCNC: 100 MG/DL (ref 74–100)
GLUCOSE UR QL STRIP.AUTO: NORMAL
HCT VFR BLD AUTO: 37.8 % (ref 37–47)
HGB BLD-MCNC: 12.3 G/DL (ref 12–16)
HIV 1+2 AB+HIV1 P24 AG SERPL QL IA: NONREACTIVE
IMM GRANULOCYTES # BLD AUTO: 0.03 X10(3)/MCL (ref 0–0.04)
IMM GRANULOCYTES NFR BLD AUTO: 0.3 %
KETONES UR QL STRIP.AUTO: ABNORMAL
LACTATE SERPL-SCNC: 1.3 MMOL/L (ref 0.5–2.2)
LEUKOCYTE ESTERASE UR QL STRIP.AUTO: NEGATIVE
LYMPHOCYTES # BLD AUTO: 3.08 X10(3)/MCL (ref 0.6–4.6)
LYMPHOCYTES NFR BLD AUTO: 28.5 %
MCH RBC QN AUTO: 31.9 PG (ref 27–31)
MCHC RBC AUTO-ENTMCNC: 32.5 G/DL (ref 33–36)
MCV RBC AUTO: 97.9 FL (ref 80–94)
MDMA UR QL SCN: NEGATIVE
MONOCYTES # BLD AUTO: 0.84 X10(3)/MCL (ref 0.1–1.3)
MONOCYTES NFR BLD AUTO: 7.8 %
MUCOUS THREADS URNS QL MICRO: ABNORMAL /LPF
N GONORRHOEA DNA SPEC QL NAA+PROBE: NOT DETECTED
NEUTROPHILS # BLD AUTO: 6.53 X10(3)/MCL (ref 2.1–9.2)
NEUTROPHILS NFR BLD AUTO: 60.2 %
NITRITE UR QL STRIP.AUTO: NEGATIVE
NRBC BLD AUTO-RTO: 0 %
OPIATES UR QL SCN: NEGATIVE
PCP UR QL: NEGATIVE
PH UR STRIP.AUTO: 6 [PH]
PH UR: 6 [PH] (ref 3–11)
PLATELET # BLD AUTO: 613 X10(3)/MCL (ref 130–400)
PMV BLD AUTO: 8.8 FL (ref 7.4–10.4)
POTASSIUM SERPL-SCNC: 4.3 MMOL/L (ref 3.5–5.1)
PROT SERPL-MCNC: 7.4 GM/DL (ref 6.4–8.3)
PROT UR QL STRIP.AUTO: ABNORMAL
RBC # BLD AUTO: 3.86 X10(6)/MCL (ref 4.2–5.4)
RBC #/AREA URNS AUTO: ABNORMAL /HPF
RBC UR QL AUTO: NEGATIVE
RPR SER QL: REACTIVE
RPR SER-TITR: ABNORMAL {TITER}
SODIUM SERPL-SCNC: 141 MMOL/L (ref 136–145)
SOURCE (OHS): NORMAL
SP GR UR STRIP.AUTO: 1.03 (ref 1–1.03)
SPECIFIC GRAVITY, URINE AUTO (.000) (OHS): 1.03 (ref 1–1.03)
SQUAMOUS #/AREA URNS LPF: ABNORMAL /HPF
T PALLIDUM AB SER QL: REACTIVE
UROBILINOGEN UR STRIP-ACNC: 2
WBC # SPEC AUTO: 10.82 X10(3)/MCL (ref 4.5–11.5)
WBC #/AREA URNS AUTO: ABNORMAL /HPF

## 2023-12-01 PROCEDURE — 81025 URINE PREGNANCY TEST: CPT | Performed by: NURSE PRACTITIONER

## 2023-12-01 PROCEDURE — 81001 URINALYSIS AUTO W/SCOPE: CPT | Mod: XB | Performed by: NURSE PRACTITIONER

## 2023-12-01 PROCEDURE — 83605 ASSAY OF LACTIC ACID: CPT | Performed by: NURSE PRACTITIONER

## 2023-12-01 PROCEDURE — 87491 CHLMYD TRACH DNA AMP PROBE: CPT | Performed by: NURSE PRACTITIONER

## 2023-12-01 PROCEDURE — 80307 DRUG TEST PRSMV CHEM ANLYZR: CPT | Performed by: NURSE PRACTITIONER

## 2023-12-01 PROCEDURE — 80053 COMPREHEN METABOLIC PANEL: CPT | Performed by: NURSE PRACTITIONER

## 2023-12-01 PROCEDURE — 63600175 PHARM REV CODE 636 W HCPCS: Mod: JZ,JG | Performed by: NURSE PRACTITIONER

## 2023-12-01 PROCEDURE — 86780 TREPONEMA PALLIDUM: CPT | Performed by: NURSE PRACTITIONER

## 2023-12-01 PROCEDURE — 87040 BLOOD CULTURE FOR BACTERIA: CPT | Performed by: NURSE PRACTITIONER

## 2023-12-01 PROCEDURE — 96372 THER/PROPH/DIAG INJ SC/IM: CPT | Performed by: NURSE PRACTITIONER

## 2023-12-01 PROCEDURE — 85025 COMPLETE CBC W/AUTO DIFF WBC: CPT | Performed by: NURSE PRACTITIONER

## 2023-12-01 PROCEDURE — 86592 SYPHILIS TEST NON-TREP QUAL: CPT | Performed by: NURSE PRACTITIONER

## 2023-12-01 PROCEDURE — 99284 EMERGENCY DEPT VISIT MOD MDM: CPT

## 2023-12-01 PROCEDURE — 87389 HIV-1 AG W/HIV-1&-2 AB AG IA: CPT | Performed by: NURSE PRACTITIONER

## 2023-12-01 RX ORDER — HYDROCODONE BITARTRATE AND ACETAMINOPHEN 7.5; 325 MG/1; MG/1
1 TABLET ORAL EVERY 6 HOURS PRN
Qty: 16 TABLET | Refills: 0 | Status: SHIPPED | OUTPATIENT
Start: 2023-12-01 | End: 2023-12-05

## 2023-12-01 RX ORDER — MUPIROCIN 20 MG/G
OINTMENT TOPICAL 3 TIMES DAILY
Qty: 22 G | Refills: 3 | Status: SHIPPED | OUTPATIENT
Start: 2023-12-01

## 2023-12-01 RX ORDER — CEPHALEXIN 500 MG/1
500 CAPSULE ORAL 4 TIMES DAILY
Qty: 40 CAPSULE | Refills: 0 | Status: SHIPPED | OUTPATIENT
Start: 2023-12-01 | End: 2023-12-11

## 2023-12-01 RX ADMIN — PENICILLIN G BENZATHINE 2.4 MILLION UNITS: 1200000 INJECTION, SUSPENSION INTRAMUSCULAR at 05:12

## 2023-12-01 NOTE — ED PROVIDER NOTES
Encounter Date: 12/1/2023       History     Chief Complaint   Patient presents with    Rash     C/o generalized rash x1 month, denies itching or known fever.      See MDM    The history is provided by the patient. No  was used.     Review of patient's allergies indicates:   Allergen Reactions    Quetiapine Anaphylaxis    Iodine     Shellfish derived      and fish    Sulfamethoxazole-trimethoprim      Past Medical History:   Diagnosis Date    Hyperlipidemia     Mental disorder     depression, anxiety    Syphilis affecting pregnancy in third trimester 7/13/2022    Trauma      History reviewed. No pertinent surgical history.  Family History   Problem Relation Age of Onset    Emphysema Father     Lung cancer Father     Throat cancer Father     COPD Father     COPD Mother     Lung cancer Mother     Emphysema Mother      Social History     Tobacco Use    Smoking status: Some Days     Current packs/day: 0.25     Types: Cigarettes    Smokeless tobacco: Never   Substance Use Topics    Alcohol use: Not Currently    Drug use: Yes     Types: Methamphetamines     Comment: last use 3d ago     Review of Systems   Constitutional:  Negative for fever.   Respiratory:  Negative for cough and shortness of breath.    Cardiovascular:  Negative for chest pain.   Gastrointestinal:  Negative for abdominal pain.   Genitourinary:  Negative for difficulty urinating and dysuria.   Musculoskeletal:  Negative for gait problem.   Skin:  Positive for rash. Negative for color change.   Neurological:  Negative for dizziness, speech difficulty and headaches.   Psychiatric/Behavioral:  Negative for hallucinations and suicidal ideas.    All other systems reviewed and are negative.      Physical Exam     Initial Vitals [12/01/23 1456]   BP Pulse Resp Temp SpO2   137/86 (!) 123 20 98.1 °F (36.7 °C) 96 %      MAP       --         Physical Exam    Nursing note and vitals reviewed.  Constitutional: She appears well-developed and  well-nourished.   HENT:   Head: Normocephalic.   Eyes: EOM are normal.   Neck:   Normal range of motion.  Cardiovascular:  Normal rate, regular rhythm, normal heart sounds and intact distal pulses.           Pulmonary/Chest: Breath sounds normal. No respiratory distress.   Abdominal: Abdomen is soft. Bowel sounds are normal. There is no abdominal tenderness.   Genitourinary:    Genitourinary Comments: Chaperone: Mari  Patient has multiple lesions to the vagina buttocks region and legs     Musculoskeletal:         General: Normal range of motion.      Cervical back: Normal range of motion.     Neurological: She is alert and oriented to person, place, and time. She has normal strength.   Skin: Skin is warm and dry.   Lesions noted to torso and upper extremities   Psychiatric: She has a normal mood and affect. Her behavior is normal. Judgment and thought content normal.         ED Course   Procedures  Labs Reviewed   COMPREHENSIVE METABOLIC PANEL - Abnormal; Notable for the following components:       Result Value    Albumin Level 2.9 (*)     Globulin 4.5 (*)     Albumin/Globulin Ratio 0.6 (*)     Alkaline Phosphatase 168 (*)     All other components within normal limits   URINALYSIS, REFLEX TO URINE CULTURE - Abnormal; Notable for the following components:    Specific Gravity, UA 1.033 (*)     Protein, UA Trace (*)     Ketones, UA Trace (*)     Urobilinogen, UA 2.0 (*)     Mucous, UA Trace (*)     All other components within normal limits   SYPHILIS ANTIBODY (WITH REFLEX RPR) - Abnormal; Notable for the following components:    Syphilis Antibody Reactive (*)     All other components within normal limits   CBC WITH DIFFERENTIAL - Abnormal; Notable for the following components:    RBC 3.86 (*)     MCV 97.9 (*)     MCH 31.9 (*)     MCHC 32.5 (*)     Platelet 613 (*)     All other components within normal limits   HCG QUALITATIVE URINE - Normal   LACTIC ACID, PLASMA - Normal   HIV 1 / 2 ANTIBODY - Normal    CHLAMYDIA/GONORRHOEAE(GC), PCR    Narrative:     The Xpert CT/NG test, performed on the GeneXpert system is a qualitative in vitro real-time polymerase chain reaction (PCR) test for the automated detected and differentiation for genomic DNA from Chlamydia trachomatis (CT) and/or Neisseria gonorrhoeae (NG).   BLOOD CULTURE [facility]   BLOOD CULTURE [facility]   CBC W/ AUTO DIFFERENTIAL    Narrative:     The following orders were created for panel order CBC Auto Differential.  Procedure                               Abnormality         Status                     ---------                               -----------         ------                     CBC with Differential[[id number]]        Abnormal            Final result                 Please view results for these tests on the individual orders.   DRUG SCREEN, URINE (BEAKER)   RPR          Imaging Results    None          Medications   penicillin G benzathine (BICILLIN LA) injection 2.4 Million Units (has no administration in time range)     Medical Decision Making  Historian:  Patient.  Patient is a 33-year-old female  that presents with rash that has been present 1 month. Associated symptoms none. Surrounding information is none. Exacerbated by nothing. Relieved by nothing. Patient treatment prior to arrival none. Risk factors include none. Other history pertaining to this complaint nothing.   Assessment:  See physical exam.  DD:  Syphilis, lesions with superficial bacterial infection, rash      Amount and/or Complexity of Data Reviewed  Labs:      Details: Labs were unremarkable.  She did test positive for syphilis.  Discussion of management or test interpretation with external provider(s): History was obtained.  Physical was performed.  Patient was treated for syphilis last year.  She maintain a relationship with a person who gave her syphilis, who did not get treated.  We will treat her for syphilis in the ER.  She does have some lesions that appear to be infected with a  superficial bacterial infection.  She is allergic to Bactrim saw put her on Keflex.  I did put her on some Bactroban as well.  She was instructed to follow up with the health unit.  No medical or surgical consult indicated in the ER.    Risk  Prescription drug management.                                      Clinical Impression:  Final diagnoses:  [A53.9] Syphilis (Primary)          ED Disposition Condition    Discharge Stable          ED Prescriptions       Medication Sig Dispense Start Date End Date Auth. Provider    mupirocin (BACTROBAN) 2 % ointment Apply topically 3 (three) times daily. 22 g 12/1/2023 -- Roberto Diop FNP    cephALEXin (KEFLEX) 500 MG capsule Take 1 capsule (500 mg total) by mouth 4 (four) times daily. for 10 days 40 capsule 12/1/2023 12/11/2023 Roberto Diop FNP    HYDROcodone-acetaminophen (NORCO) 7.5-325 mg per tablet Take 1 tablet by mouth every 6 (six) hours as needed for Pain. 16 tablet 12/1/2023 12/5/2023 Roberto Diop FNP          Follow-up Information       Follow up With Specialties Details Why Contact Info    Your Primary Care Provider  Call in 3 days ed follow up              Roberto Diop FNP  12/01/23 7286

## 2023-12-01 NOTE — FIRST PROVIDER EVALUATION
"Medical screening examination initiated.  I have conducted a focused provider triage encounter, findings are as follows:    Brief history of present illness:  Patient states "sores all over my body." States intermittent purulent drainage. States that she is also concerned that she may have been exposed to syphilis.     Vitals:    12/01/23 1456   BP: 137/86   Pulse: (!) 123   Resp: 20   Temp: 98.1 °F (36.7 °C)   TempSrc: Oral   SpO2: 96%   Weight: 52.2 kg (115 lb)   Height: 5' 2" (1.575 m)       Pertinent physical exam:  Awake, alert, ambulatory      Brief workup plan:  Labs    Preliminary workup initiated; this workup will be continued and followed by the physician or advanced practice provider that is assigned to the patient when roomed.  "

## 2023-12-06 LAB
BACTERIA BLD CULT: NORMAL
BACTERIA BLD CULT: NORMAL

## 2024-09-14 ENCOUNTER — HOSPITAL ENCOUNTER (EMERGENCY)
Facility: HOSPITAL | Age: 34
Discharge: HOME | End: 2024-09-14
Attending: EMERGENCY MEDICINE
Payer: MEDICAID

## 2024-09-14 VITALS
RESPIRATION RATE: 18 BRPM | WEIGHT: 114.63 LBS | BODY MASS INDEX: 21.1 KG/M2 | DIASTOLIC BLOOD PRESSURE: 78 MMHG | SYSTOLIC BLOOD PRESSURE: 110 MMHG | OXYGEN SATURATION: 99 % | HEIGHT: 62 IN | HEART RATE: 101 BPM | TEMPERATURE: 98 F

## 2024-09-14 DIAGNOSIS — N76.4 LEFT GENITAL LABIAL ABSCESS: Primary | ICD-10-CM

## 2024-09-14 PROCEDURE — 99283 EMERGENCY DEPT VISIT LOW MDM: CPT

## 2024-09-14 RX ORDER — CLINDAMYCIN HYDROCHLORIDE 150 MG/1
450 CAPSULE ORAL 3 TIMES DAILY
Qty: 63 CAPSULE | Refills: 0 | Status: SHIPPED | OUTPATIENT
Start: 2024-09-14 | End: 2024-09-21

## 2024-09-14 NOTE — ED PROVIDER NOTES
Encounter Date: 9/14/2024       History     Chief Complaint   Patient presents with    Abscess     C/o abscess vaginal area x 4 days. States has been draining purulent drainage. States feels as if it may be a spider bite.      Nancy Ramos is a 33 y.o. female with a history of HTN who presents to the ED for evaluation of abscess to left labia x 4 days. Pt reports last night it opened and started to drain a lot of purulent fluid. She is concerned she may need antibiotics. She reports pain, but has improved since draining. She denies fevers, chills.     The history is provided by the patient.     Review of patient's allergies indicates:   Allergen Reactions    Quetiapine Anaphylaxis    Iodine     Shellfish derived      and fish    Sulfamethoxazole-trimethoprim      Past Medical History:   Diagnosis Date    Hyperlipidemia     Mental disorder     depression, anxiety    Syphilis affecting pregnancy in third trimester 7/13/2022    Trauma      History reviewed. No pertinent surgical history.  Family History   Problem Relation Name Age of Onset    Emphysema Father      Lung cancer Father      Throat cancer Father      COPD Father      COPD Mother      Lung cancer Mother      Emphysema Mother       Social History     Tobacco Use    Smoking status: Some Days     Current packs/day: 0.25     Types: Cigarettes    Smokeless tobacco: Never   Substance Use Topics    Alcohol use: Not Currently    Drug use: Yes     Types: Methamphetamines     Comment: last use 3d ago     Review of Systems   Constitutional:  Negative for fever.   HENT:  Negative for sore throat.    Respiratory:  Negative for shortness of breath.    Cardiovascular:  Negative for chest pain.   Gastrointestinal:  Negative for nausea.   Genitourinary:  Negative for dysuria.   Musculoskeletal:  Negative for back pain.   Skin:  Positive for wound. Negative for rash.   Neurological:  Negative for weakness.   Hematological:  Does not bruise/bleed easily.       Physical  Exam     Initial Vitals [09/14/24 0917]   BP Pulse Resp Temp SpO2   105/66 (!) 119 20 98.1 °F (36.7 °C) 97 %      MAP       --         Physical Exam    Nursing note and vitals reviewed.  Constitutional: She appears well-developed and well-nourished. No distress.   HENT:   Head: Normocephalic and atraumatic.   Mouth/Throat: No oropharyngeal exudate.   Eyes: EOM are normal. No scleral icterus.   Neck: Neck supple.   Normal range of motion.  Cardiovascular:  Normal rate and regular rhythm.           No murmur heard.  Pulmonary/Chest: Breath sounds normal. No respiratory distress. She has no wheezes.   Abdominal: Abdomen is soft. Bowel sounds are normal. She exhibits no distension. There is no abdominal tenderness.   Musculoskeletal:         General: No tenderness. Normal range of motion.      Cervical back: Normal range of motion and neck supple.     Neurological: She is alert and oriented to person, place, and time. No cranial nerve deficit.   Skin: Skin is warm and dry. Capillary refill takes less than 2 seconds. Abscess noted. No erythema.   2x2 cm draining abscess to left labia majora. No fluctuance or surrounding warmth/erythema.    Psychiatric: She has a normal mood and affect. Her behavior is normal. Judgment and thought content normal.         ED Course   Procedures  Labs Reviewed - No data to display       Imaging Results    None          Medications - No data to display  Medical Decision Making  Pt abscess already drained, minimal amount of drainage able to be expressed. She is HDS and afebrile. Does not want labs obtained today. Stable for discharge with clindamycin TID x 7 days. Will also place gyn referral for follow up. ED return precautions given. She verbalized understanding. All questions answered.     Risk  Prescription drug management.                                      Clinical Impression:  Final diagnoses:  [N76.4] Left genital labial abscess (Primary)          ED Disposition Condition     Discharge Stable          ED Prescriptions       Medication Sig Dispense Start Date End Date Auth. Provider    clindamycin (CLEOCIN) 150 MG capsule Take 3 capsules (450 mg total) by mouth 3 (three) times daily. for 7 days 63 capsule 9/14/2024 9/21/2024 Carolina Michelle PA-C          Follow-up Information       Follow up With Specialties Details Why Contact Info    [facility] Emergency Medicine  If symptoms worsen [address] [facility] [address] [address] [phone]    Annalisa Bennett, DANTE Family [facility] In 3 days Hospital follow up [address] [facility] [id number] [phone]      [facility] GYN Gynecology Schedule an appointment as soon as possible for a visit   [address] [address] [phone]             Carolina Michelle PA-C  09/14/24 1007

## 2024-09-24 ENCOUNTER — HOSPITAL ENCOUNTER (EMERGENCY)
Facility: HOSPITAL | Age: 34
Discharge: HOME | End: 2024-09-24
Attending: EMERGENCY MEDICINE
Payer: MEDICAID

## 2024-09-24 VITALS
HEIGHT: 62 IN | RESPIRATION RATE: 18 BRPM | TEMPERATURE: 98 F | WEIGHT: 120 LBS | BODY MASS INDEX: 22.08 KG/M2 | DIASTOLIC BLOOD PRESSURE: 57 MMHG | SYSTOLIC BLOOD PRESSURE: 112 MMHG | OXYGEN SATURATION: 95 % | HEART RATE: 94 BPM

## 2024-09-24 DIAGNOSIS — F19.10 SUBSTANCE ABUSE: ICD-10-CM

## 2024-09-24 DIAGNOSIS — J18.9 PNEUMONIA OF BOTH LUNGS DUE TO INFECTIOUS ORGANISM, UNSPECIFIED PART OF LUNG: Primary | ICD-10-CM

## 2024-09-24 LAB
ALBUMIN SERPL-MCNC: 2.9 G/DL (ref 3.5–5)
ALBUMIN/GLOB SERPL: 0.6 RATIO (ref 1.1–2)
ALP SERPL-CCNC: 119 UNIT/L (ref 40–150)
ALT SERPL-CCNC: 12 UNIT/L (ref 0–55)
AMPHET UR QL SCN: POSITIVE
ANION GAP SERPL CALC-SCNC: 8 MEQ/L
AST SERPL-CCNC: 11 UNIT/L (ref 5–34)
B-HCG UR QL: NEGATIVE
BACTERIA #/AREA URNS AUTO: ABNORMAL /HPF
BARBITURATE SCN PRESENT UR: NEGATIVE
BASOPHILS # BLD AUTO: 0.04 X10(3)/MCL
BASOPHILS NFR BLD AUTO: 0.2 %
BENZODIAZ UR QL SCN: NEGATIVE
BILIRUB SERPL-MCNC: 0.4 MG/DL
BILIRUB UR QL STRIP.AUTO: NEGATIVE
BUN SERPL-MCNC: 9.8 MG/DL (ref 7–18.7)
CALCIUM SERPL-MCNC: 8.6 MG/DL (ref 8.4–10.2)
CANNABINOIDS UR QL SCN: NEGATIVE
CHLORIDE SERPL-SCNC: 106 MMOL/L (ref 98–107)
CLARITY UR: ABNORMAL
CO2 SERPL-SCNC: 25 MMOL/L (ref 22–29)
COCAINE UR QL SCN: NEGATIVE
COLOR UR AUTO: ABNORMAL
CREAT SERPL-MCNC: 0.76 MG/DL (ref 0.55–1.02)
CREAT/UREA NIT SERPL: 13
EOSINOPHIL # BLD AUTO: 0.15 X10(3)/MCL (ref 0–0.9)
EOSINOPHIL NFR BLD AUTO: 0.9 %
ERYTHROCYTE [DISTWIDTH] IN BLOOD BY AUTOMATED COUNT: 15.4 % (ref 11.5–17)
FENTANYL UR QL SCN: POSITIVE
FLUAV AG UPPER RESP QL IA.RAPID: NOT DETECTED
FLUBV AG UPPER RESP QL IA.RAPID: NOT DETECTED
GFR SERPLBLD CREATININE-BSD FMLA CKD-EPI: >60 ML/MIN/1.73/M2
GLOBULIN SER-MCNC: 4.9 GM/DL (ref 2.4–3.5)
GLUCOSE SERPL-MCNC: 91 MG/DL (ref 74–100)
GLUCOSE UR QL STRIP: NEGATIVE
HCT VFR BLD AUTO: 33.8 % (ref 37–47)
HGB BLD-MCNC: 11.3 G/DL (ref 12–16)
HGB UR QL STRIP: ABNORMAL
IMM GRANULOCYTES # BLD AUTO: 0.09 X10(3)/MCL (ref 0–0.04)
IMM GRANULOCYTES NFR BLD AUTO: 0.5 %
KETONES UR QL STRIP: NEGATIVE
LEUKOCYTE ESTERASE UR QL STRIP: ABNORMAL
LIPASE SERPL-CCNC: 6 U/L
LYMPHOCYTES # BLD AUTO: 3.07 X10(3)/MCL (ref 0.6–4.6)
LYMPHOCYTES NFR BLD AUTO: 17.6 %
MCH RBC QN AUTO: 32.5 PG (ref 27–31)
MCHC RBC AUTO-ENTMCNC: 33.4 G/DL (ref 33–36)
MCV RBC AUTO: 97.1 FL (ref 80–94)
MDMA UR QL SCN: NEGATIVE
MONOCYTES # BLD AUTO: 0.92 X10(3)/MCL (ref 0.1–1.3)
MONOCYTES NFR BLD AUTO: 5.3 %
MUCOUS THREADS URNS QL MICRO: ABNORMAL /LPF
NEUTROPHILS # BLD AUTO: 13.21 X10(3)/MCL (ref 2.1–9.2)
NEUTROPHILS NFR BLD AUTO: 75.5 %
NITRITE UR QL STRIP: NEGATIVE
NRBC BLD AUTO-RTO: 0 %
OPIATES UR QL SCN: POSITIVE
PCP UR QL: NEGATIVE
PH UR STRIP: 6 [PH]
PH UR: 6 [PH] (ref 3–11)
PLATELET # BLD AUTO: 326 X10(3)/MCL (ref 130–400)
PMV BLD AUTO: 9.6 FL (ref 7.4–10.4)
POTASSIUM SERPL-SCNC: 3.4 MMOL/L (ref 3.5–5.1)
PROT SERPL-MCNC: 7.8 GM/DL (ref 6.4–8.3)
PROT UR QL STRIP: 30
RBC # BLD AUTO: 3.48 X10(6)/MCL (ref 4.2–5.4)
RBC #/AREA URNS AUTO: ABNORMAL /HPF
SARS-COV-2 RNA RESP QL NAA+PROBE: NOT DETECTED
SODIUM SERPL-SCNC: 139 MMOL/L (ref 136–145)
SP GR UR STRIP.AUTO: >=1.03 (ref 1–1.03)
SPECIFIC GRAVITY, URINE AUTO (.000) (OHS): >=1.03 (ref 1–1.03)
SQUAMOUS #/AREA URNS AUTO: ABNORMAL /HPF
TRICHOMONAS UR QL COMP ASSIST: ABNORMAL /HPF
UROBILINOGEN UR STRIP-ACNC: 1
WBC # BLD AUTO: 17.48 X10(3)/MCL (ref 4.5–11.5)
WBC #/AREA URNS AUTO: ABNORMAL /HPF

## 2024-09-24 PROCEDURE — 94640 AIRWAY INHALATION TREATMENT: CPT

## 2024-09-24 PROCEDURE — 87086 URINE CULTURE/COLONY COUNT: CPT | Performed by: EMERGENCY MEDICINE

## 2024-09-24 PROCEDURE — 81025 URINE PREGNANCY TEST: CPT | Performed by: EMERGENCY MEDICINE

## 2024-09-24 PROCEDURE — 99285 EMERGENCY DEPT VISIT HI MDM: CPT | Mod: 25

## 2024-09-24 PROCEDURE — 85025 COMPLETE CBC W/AUTO DIFF WBC: CPT | Performed by: EMERGENCY MEDICINE

## 2024-09-24 PROCEDURE — 80053 COMPREHEN METABOLIC PANEL: CPT | Performed by: EMERGENCY MEDICINE

## 2024-09-24 PROCEDURE — 80307 DRUG TEST PRSMV CHEM ANLYZR: CPT | Performed by: EMERGENCY MEDICINE

## 2024-09-24 PROCEDURE — 81001 URINALYSIS AUTO W/SCOPE: CPT | Performed by: EMERGENCY MEDICINE

## 2024-09-24 PROCEDURE — 87040 BLOOD CULTURE FOR BACTERIA: CPT | Performed by: EMERGENCY MEDICINE

## 2024-09-24 PROCEDURE — 99900031 HC PATIENT EDUCATION (STAT)

## 2024-09-24 PROCEDURE — 25000003 PHARM REV CODE 250: Performed by: EMERGENCY MEDICINE

## 2024-09-24 PROCEDURE — 81003 URINALYSIS AUTO W/O SCOPE: CPT | Performed by: EMERGENCY MEDICINE

## 2024-09-24 PROCEDURE — 83690 ASSAY OF LIPASE: CPT | Performed by: EMERGENCY MEDICINE

## 2024-09-24 PROCEDURE — 0240U COVID/FLU A&B PCR: CPT | Performed by: EMERGENCY MEDICINE

## 2024-09-24 PROCEDURE — 25000242 PHARM REV CODE 250 ALT 637 W/ HCPCS: Performed by: EMERGENCY MEDICINE

## 2024-09-24 RX ORDER — IPRATROPIUM BROMIDE AND ALBUTEROL SULFATE 2.5; .5 MG/3ML; MG/3ML
3 SOLUTION RESPIRATORY (INHALATION)
Status: COMPLETED | OUTPATIENT
Start: 2024-09-24 | End: 2024-09-24

## 2024-09-24 RX ORDER — LEVOFLOXACIN 750 MG/1
750 TABLET ORAL DAILY
Qty: 7 TABLET | Refills: 0 | Status: SHIPPED | OUTPATIENT
Start: 2024-09-24 | End: 2024-10-01

## 2024-09-24 RX ORDER — LEVOFLOXACIN 750 MG/1
750 TABLET ORAL ONCE
Status: COMPLETED | OUTPATIENT
Start: 2024-09-24 | End: 2024-09-24

## 2024-09-24 RX ORDER — LEVOFLOXACIN 750 MG/1
750 TABLET ORAL DAILY
Status: DISCONTINUED | OUTPATIENT
Start: 2024-09-24 | End: 2024-09-24

## 2024-09-24 RX ORDER — ALBUTEROL SULFATE 90 UG/1
1-2 INHALANT RESPIRATORY (INHALATION) EVERY 6 HOURS PRN
Qty: 18 G | Refills: 2 | Status: SHIPPED | OUTPATIENT
Start: 2024-09-24 | End: 2025-09-24

## 2024-09-24 RX ADMIN — IPRATROPIUM BROMIDE AND ALBUTEROL SULFATE 3 ML: 2.5; .5 SOLUTION RESPIRATORY (INHALATION) at 03:09

## 2024-09-24 RX ADMIN — LEVOFLOXACIN 750 MG: 750 TABLET, FILM COATED ORAL at 05:09

## 2024-09-24 NOTE — ED PROVIDER NOTES
Encounter Date: 9/24/2024       History     Chief Complaint   Patient presents with    Abdominal Pain     33-year-old female complains of a 5 day history of generalized abdominal pain, malaise, decreased appetite, cough. No fever.  She admits to smoking cigarettes and smoking marijuana but denies any other substance abuse.  She has a history of depression and anxiety but states that is stable right now.  She has been homeless in the past, currently living with her father.    The history is provided by the patient.     Review of patient's allergies indicates:   Allergen Reactions    Quetiapine Anaphylaxis    Iodine     Shellfish derived      and fish    Sulfamethoxazole-trimethoprim      Past Medical History:   Diagnosis Date    Hyperlipidemia     Mental disorder     depression, anxiety    Syphilis affecting pregnancy in third trimester 7/13/2022    Trauma      No past surgical history on file.  Family History   Problem Relation Name Age of Onset    Emphysema Father      Lung cancer Father      Throat cancer Father      COPD Father      COPD Mother      Lung cancer Mother      Emphysema Mother       Social History     Tobacco Use    Smoking status: Some Days     Current packs/day: 0.25     Types: Cigarettes    Smokeless tobacco: Never   Substance Use Topics    Alcohol use: Not Currently    Drug use: Yes     Types: Methamphetamines     Comment: last use 3d ago     Review of Systems   Constitutional:  Positive for fatigue. Negative for fever.   Respiratory:  Positive for cough.    Gastrointestinal:  Positive for abdominal pain.   All other systems reviewed and are negative.      Physical Exam     Initial Vitals [09/24/24 0302]   BP Pulse Resp Temp SpO2   (!) 112/57 95 18 98.3 °F (36.8 °C) 95 %      MAP       --         Physical Exam    Nursing note and vitals reviewed.  Constitutional: She appears well-developed and well-nourished. She is not diaphoretic. No distress.   HENT:   Head: Normocephalic and atraumatic.    Mouth/Throat: Oropharynx is clear and moist.   Eyes: Conjunctivae are normal. Pupils are equal, round, and reactive to light.   Neck: Neck supple.   Cardiovascular:  Normal rate, regular rhythm, normal heart sounds and intact distal pulses.           Pulmonary/Chest: Breath sounds normal. No respiratory distress. She has no wheezes. She has no rhonchi. She has no rales.   Abdominal: Abdomen is soft. She exhibits no distension. There is no abdominal tenderness. There is no guarding.   Musculoskeletal:         General: No tenderness or edema. Normal range of motion.      Cervical back: Neck supple.     Neurological: She is alert and oriented to person, place, and time.   Skin: Skin is warm and dry. Capillary refill takes less than 2 seconds. No rash noted.   Psychiatric: She has a normal mood and affect. Thought content normal.         ED Course   Procedures  Labs Reviewed   URINALYSIS, REFLEX TO URINE CULTURE - Abnormal       Result Value    Color, UA Dark Yellow      Appearance, UA Hazy (*)     Specific Gravity, UA >=1.030      pH, UA 6.0      Protein, UA 30 (*)     Glucose, UA Negative      Ketones, UA Negative      Blood, UA Moderate (*)     Bilirubin, UA Negative      Urobilinogen, UA 1.0      Nitrites, UA Negative      Leukocyte Esterase, UA Small (*)    DRUG SCREEN, URINE (BEAKER) - Abnormal    Amphetamines, Urine Positive (*)     Barbiturates, Urine Negative      Benzodiazepine, Urine Negative      Cannabinoids, Urine Negative      Cocaine, Urine Negative      Fentanyl, Urine Positive (*)     MDMA, Urine Negative      Opiates, Urine Positive (*)     Phencyclidine, Urine Negative      pH, Urine 6.0      Specific Gravity, Urine Auto >=1.030      Narrative:     Cut off concentrations:    Amphetamines - 1000 ng/ml  Barbiturates - 200 ng/ml  Benzodiazepine - 200 ng/ml  Cannabinoids (THC) - 50 ng/ml  Cocaine - 300 ng/ml  Fentanyl - 1.0 ng/ml  MDMA - 500 ng/ml  Opiates - 300 ng/ml   Phencyclidine (PCP) - 25  ng/ml    Specimen submitted for drug analysis and tested for pH and specific gravity in order to evaluate sample integrity. Suspect tampering if specific gravity is <1.003 and/or pH is not within the range of 4.5 - 8.0  False negatives may result form substances such as bleach added to urine.  False positives may result for the presence of a substance with similar chemical structure to the drug or its metabolite.    This test provides only a PRELIMINARY analytical test result. A more specific alternate chemical method must be used in order to obtain a confirmed analytical result. Gas chromatography/mass spectrometry (GC/MS) is the preferred confirmatory method. Other chemical confirmation methods are available. Clinical consideration and professional judgement should be applied to any drug of abuse test result, particularly when preliminary positive results are used.    Positive results will be confirmed only at the physicians request. Unconfirmed screening results are to be used only for medical purposes (treatment).        CBC WITH DIFFERENTIAL - Abnormal    WBC 17.48 (*)     RBC 3.48 (*)     Hgb 11.3 (*)     Hct 33.8 (*)     MCV 97.1 (*)     MCH 32.5 (*)     MCHC 33.4      RDW 15.4      Platelet 326      MPV 9.6      Neut % 75.5      Lymph % 17.6      Mono % 5.3      Eos % 0.9      Basophil % 0.2      Lymph # 3.07      Neut # 13.21 (*)     Mono # 0.92      Eos # 0.15      Baso # 0.04      IG# 0.09 (*)     IG% 0.5      NRBC% 0.0     COMPREHENSIVE METABOLIC PANEL - Abnormal    Sodium 139      Potassium 3.4 (*)     Chloride 106      CO2 25      Glucose 91      Blood Urea Nitrogen 9.8      Creatinine 0.76      Calcium 8.6      Protein Total 7.8      Albumin 2.9 (*)     Globulin 4.9 (*)     Albumin/Globulin Ratio 0.6 (*)     Bilirubin Total 0.4            ALT 12      AST 11      eGFR >60      Anion Gap 8.0      BUN/Creatinine Ratio 13     URINALYSIS, MICROSCOPIC - Abnormal    Bacteria, UA Few (*)     Mucous, UA  Small (*)     Trichomonas, UA Few (*)     RBC, UA 0-5      WBC, UA 21-50 (*)     Squamous Epithelial Cells, UA Few (*)    PREGNANCY TEST, URINE RAPID - Normal    hCG Qualitative, Urine Negative     LIPASE - Normal    Lipase Level 6     COVID/FLU A&B PCR - Normal    Influenza A PCR Not Detected      Influenza B PCR Not Detected      SARS-CoV-2 PCR Not Detected      Narrative:     The Xpert Xpress SARS-CoV-2/FLU/RSV plus is a rapid, multiplexed real-time PCR test intended for the simultaneous qualitative detection and differentiation of SARS-CoV-2, Influenza A, Influenza B, and respiratory syncytial virus (RSV) viral RNA in either nasopharyngeal swab or nasal swab specimens.         CULTURE, URINE   BLOOD CULTURE [facility]          Imaging Results              CT Chest Abdomen Pelvis Without Contrast (XPD) (Final result)  Result time 09/24/24 06:44:29      Final result by Levi Macias MD (09/24/24 06:44:29)                   Narrative:    EXAMINATION  CT CHEST ABDOMEN PELVIS WITHOUT CONTRAST(XPD)    CLINICAL HISTORY  Sepsis;Chest congestion, right upper quadrant pain;    TECHNIQUE  Non-contrast helical-acquisition CT images were obtained and multiplanar reformats accomplished by a CT technologist at a separate workstation, pushed to PACS for physician review.  Enteric contrast was not utilized.    COMPARISON  18 August 2023 abdominopelvic CT; no prior thoracic CT imaging    FINDINGS  Images were reviewed in soft tissue, lung, and bone windows.    Exam quality: Inherently limited vascular and soft tissue assessment due to utilization of non-contrast protocol.    Lines/tubes: none visualized    Cardiovascular: Normal heart chamber size. No pericardial effusion.  Normal vessel caliber and contour through the chest, abdomen, and pelvis.    Lungs/Pleura: Central airways are widely patent.   There are mild paraseptal emphysematous changes in both upper lobes. There are diffuse ground glass nodules scattered in both lungs.  Additionally there are centrilobular nodules demonstrated in the right middle and both lower lobes. These findings are consistent an infectious process. No pleural thickening, effusion, or pneumothorax.    Abdominal Solid Organs: No acute process, focal injury, or other suspicious CT findings.  Bilateral renal enhancement is temporally symmetric.    Gallbladder/Biliary: No acute process, calcified stone, or evidence of biliary obstruction.    Gastrointestinal: No evidence of acute esophageal or abdominal hollow viscus injury. No active inflammatory process.    Pelvic Organs: No focal abnormality of the urinary bladder or visualized reproductive structures.    Peritoneal Cavity/Retroperitoneum: No free fluid or air, and no drainable collections.    Musculoskeletal: No acute or focal subcutaneous or muscular abnormality.  No acutely displaced fracture or traumatic spinal column malalignment.    Other findings: No pathologic tamie enlargement or necrotic adenopathy.  The thyroid is unremarkable.    IMPRESSION  1. Findings suggestive of bilateral multilobar/atypical pneumonia.  2. No evidence of acute abdominopelvic process.  ==========    No significant discrepancy identified in relation to the teleradiology preliminary report.    RADIATION DOSE  Automated tube current modulation, weight-based exposure dosing, and/or iterative reconstruction technique utilized to reach lowest reasonably achievable exposure rate.    DLP: 403 mGy*cm      Electronically signed by: Levi Macias  Date:    09/24/2024  Time:    06:44                      Preliminary result by Jovon Zhao MD (09/24/24 04:38:32)                   Impression:    1. There are mild paraseptal emphysematous changes in both upper lobes. There are diffuse ground glass nodules scattered in both lungs. Additionally there are centrilobular nodules demonstrated in the right middle and both lower lobes. These findings are consistent an infectious process. Correlate with  clinical and laboratory findings and recommend additional evaluation and followup.  2. No acute intraabdominal or pelvic solid organ or bowel pathology identified. Details and other findings as discussed above.               Narrative:    START OF REPORT:  Technique: CT Scan of the chest abdomen and pelvis was performed without intravenous contrast with axial as well as sagittal and, coronal images.    Dosage Information: Automated Exposure Control was utilized 403.41 mGy.cm.    Comparison: None.    Clinical History: RUQ abd pain starting Monday. Denies n/v/d.    Findings:  Neck: The visualized soft tissues of the neck appear unremarkable.  Mediastinum: A few subcentimeter mediastinal lymph nodes are seen upper paratracheal precarinal and paraaortic . The largest is in pre carinal space and measures 9.6 mm in least dimension. This suggests reactive lymphadenopathy.  Heart: The heart appears unremarkable.  Aorta: Unremarkable appearing aorta.  Pulmonary Arteries: Unremarkable.  Lungs: There are mild paraseptal emphysematous changes in both upper lobes. There are diffuse ground glass nodules scattered in both lungs. Additionally there are centrilobular nodules demonstrated in the right middle and both lower lobes. These findings are consistent an infectious process.  Pleura: No effusions or pneumothorax are identified.  Bony Structures:  Spine: The visualized dorsal spine appears unremarkable.  Ribs: No rib fractures are identified.  Abdomen:  Abdominal Wall: No abdominal wall pathology is seen.  Liver: The liver appears unremarkable.  Biliary System: No intrahepatic or extrahepatic biliary duct dilatation is seen.  Gallbladder: The gallbladder appears unremarkable.  Pancreas: The pancreas appears unremarkable.  Spleen: The spleen appears unremarkable.  Adrenals: The adrenal glands appear unremarkable.  Kidneys: Note of subtle increased attenuation in the medullary portions of the renal parenchyma bilaterally which  may reflect medullary calcinosis.  Aorta: The visualized abdominal aorta appears unremarkable.  IVC: Unremarkable.  Bowel:  Esophagus: The visualized distal esophagus appears unremarkable.  Stomach: The stomach appears unremarkable.  Duodenum: Unremarkable appearing duodenum.  Small Bowel: The small bowel appears unremarkable.  Colon: Nondistended.  Appendix: The appendix appears unremarkable and is partially seen on Image 161, Series 2 through Image 160, Series 2.  Peritoneum: No intraperitoneal free air or ascites is seen.    Pelvis:  Bladder: The bladder is nondistended and cannot be definitively evaluated.  Female:  Uterus: The uterus appears unremarkable.  Ovaries: No adnexal masses are seen.    Bony structures: There is an old healed sternal fracture.  Dorsal Spine: The visualized dorsal spine appears unremarkable.  Bony Pelvis: The visualized bony structures of the pelvis appear unremarkable.                                         Medications   albuterol-ipratropium 2.5 mg-0.5 mg/3 mL nebulizer solution 3 mL (3 mLs Nebulization Given 9/24/24 0344)   levoFLOXacin tablet 750 mg (750 mg Oral Given 9/24/24 0559)     Medical Decision Making  See HPI for narrative    Differential diagnosis includes but is not limited to pneumonia, gastroenteritis, dehydration, sepsis, bowel obstruction, electrolyte abnormality, substance abuse, any number of other disorder nurse    Problems Addressed:  Pneumonia of both lungs due to infectious organism, unspecified part of lung:     Details: Patient was seen and evaluated in the ER with history, physical exam, xray and CT.  Workup consistent with pneumonia and I will tx her outpatient.  She is stable for discharge and all questions were answered.  ER return precautions discussed    Amount and/or Complexity of Data Reviewed  Labs: ordered. Decision-making details documented in ED Course.  Radiology: ordered.    Risk  Prescription drug management.               ED Course as of  09/24/24 2153   Tue Sep 24, 2024   0349 WBC(!): 17.48 [SH]   0349 Hemoglobin(!): 11.3 [SH]   0349 Hematocrit(!): 33.8 [SH]   0349 Platelet Count: 326 [SH]   0510 Consistent community-acquired pneumonia.  Curb 65 score is 0 and I feel she would be appropriate for outpatient treatment.  She does have a history of substance abuse but states that she feels well enough to go home and is staying with her dad right and so she has a place to stay.  She states that she will be able to fill her antibiotic prescription and take her medication and ER return precautions were discussed. [SH]   0513 Smoking cessation was discussed.  She states that she will try to quit smoking but did not sound very optimistic [SH]      ED Course User Index  [SH] Nancy Nielson MD                           Clinical Impression:  Final diagnoses:  [J18.9] Pneumonia of both lungs due to infectious organism, unspecified part of lung (Primary)  [F19.10] Substance abuse          ED Disposition Condition    Discharge Stable          ED Prescriptions       Medication Sig Dispense Start Date End Date Auth. Provider    levoFLOXacin (LEVAQUIN) 750 MG tablet Take 1 tablet (750 mg total) by mouth once daily. for 7 days 7 tablet 9/24/2024 10/1/2024 Nancy Nielson MD    albuterol (PROVENTIL/VENTOLIN HFA) 90 mcg/actuation inhaler Inhale 1-2 puffs into the lungs every 6 (six) hours as needed for Wheezing. Rescue 18 g 9/24/2024 9/24/2025 Nancy Nielson MD          Follow-up Information       Follow up With Specialties Details Why Contact Info    Annalisa Bennett, [facility] Schedule an appointment as soon as possible for a visit   [address] [place] [id number] [phone]               Nancy Nielson MD  09/24/24 2153

## 2024-09-26 LAB — BACTERIA UR CULT: NO GROWTH

## 2024-09-28 LAB — BACTERIA BLD CULT: NORMAL

## 2024-09-29 LAB — BACTERIA BLD CULT: NORMAL

## 2024-11-11 ENCOUNTER — HOSPITAL ENCOUNTER (EMERGENCY)
Facility: HOSPITAL | Age: 34
Discharge: LEFT BEFORE DISCHARGE | End: 2024-11-11
Payer: MEDICAID

## 2024-11-11 VITALS
WEIGHT: 125 LBS | OXYGEN SATURATION: 95 % | RESPIRATION RATE: 14 BRPM | TEMPERATURE: 98 F | SYSTOLIC BLOOD PRESSURE: 133 MMHG | BODY MASS INDEX: 23 KG/M2 | HEIGHT: 62 IN | HEART RATE: 80 BPM | DIASTOLIC BLOOD PRESSURE: 90 MMHG
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
## 2024-11-11 PROCEDURE — 25000003 PHARM REV CODE 250: Performed by: NURSE PRACTITIONER

## 2024-11-11 PROCEDURE — 99283 EMERGENCY DEPT VISIT LOW MDM: CPT | Mod: 25

## 2024-11-11 RX ORDER — HYDROCODONE BITARTRATE AND ACETAMINOPHEN 7.5; 325 MG/1; MG/1
1 TABLET ORAL
Status: COMPLETED | OUTPATIENT
Start: 2024-11-11 | End: 2024-11-11

## 2024-11-11 RX ADMIN — HYDROCODONE BITARTRATE AND ACETAMINOPHEN 1 TABLET: 7.5; 325 TABLET ORAL at 03:11
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
